# Patient Record
Sex: FEMALE | Race: WHITE | NOT HISPANIC OR LATINO | Employment: OTHER | URBAN - NONMETROPOLITAN AREA
[De-identification: names, ages, dates, MRNs, and addresses within clinical notes are randomized per-mention and may not be internally consistent; named-entity substitution may affect disease eponyms.]

---

## 2017-02-20 ENCOUNTER — OFFICE VISIT (OUTPATIENT)
Dept: NEUROSURGERY | Facility: CLINIC | Age: 72
End: 2017-02-20

## 2017-02-20 VITALS
SYSTOLIC BLOOD PRESSURE: 138 MMHG | WEIGHT: 135 LBS | BODY MASS INDEX: 23.05 KG/M2 | HEIGHT: 64 IN | DIASTOLIC BLOOD PRESSURE: 82 MMHG

## 2017-02-20 DIAGNOSIS — G56.03 CARPAL TUNNEL SYNDROME, BILATERAL: ICD-10-CM

## 2017-02-20 DIAGNOSIS — Z78.9 TOBACCO NON-USER: ICD-10-CM

## 2017-02-20 DIAGNOSIS — M51.36 DEGENERATION OF LUMBAR INTERVERTEBRAL DISC: Primary | ICD-10-CM

## 2017-02-20 DIAGNOSIS — IMO0001 NORMAL BODY MASS INDEX (BMI): ICD-10-CM

## 2017-02-20 PROCEDURE — 99212 OFFICE O/P EST SF 10 MIN: CPT | Performed by: NURSE PRACTITIONER

## 2017-02-20 RX ORDER — ESTRADIOL 0.5 MG/1
0.5 TABLET ORAL DAILY
COMMUNITY
End: 2018-09-07

## 2017-02-20 RX ORDER — CYCLOBENZAPRINE HCL 5 MG
5 TABLET ORAL AS NEEDED
COMMUNITY
End: 2017-02-20

## 2017-02-20 RX ORDER — CYCLOBENZAPRINE HCL 10 MG
10 TABLET ORAL 3 TIMES DAILY PRN
COMMUNITY
End: 2017-02-20 | Stop reason: SDUPTHER

## 2017-02-20 RX ORDER — LEVOTHYROXINE SODIUM 137 UG/1
125 TABLET ORAL DAILY
COMMUNITY
End: 2017-05-04 | Stop reason: SDUPTHER

## 2017-02-20 RX ORDER — CYCLOBENZAPRINE HCL 10 MG
10 TABLET ORAL 3 TIMES DAILY PRN
Qty: 90 TABLET | Refills: 3 | Status: SHIPPED | OUTPATIENT
Start: 2017-02-20 | End: 2017-03-22

## 2017-02-20 NOTE — PROGRESS NOTES
"Neurosurgery Follow Up Office Visit      Patient Name:  Mary Ann Jones  Age:  71 y.o.  YOB: 1945  MR#:  [id number]    Visit Vitals   • /82   • Ht 64\" (162.6 cm)   • Wt 135 lb (61.2 kg)   • BMI 23.17 kg/m2       Social History   Substance Use Topics   • Smoking status: Never Smoker   • Smokeless tobacco: Never Used   • Alcohol use Yes       Chief Complaint   Patient presents with   • Back Pain     Complains of increasing back pain x 2-3 months. No leg pain. Patient has not had physical therapy or pain management.    • Hand Pain     Follow up on carpal tunnel syndrome. She is still wearing wrist splints at bedtime and when napping.          History  Chief Complaint:  She returns for 6 month follow-up.  She is seen with more chronic issues of low back pain and also a newer diagnosis of bilateral carpal tunnel.  She has been managing pretty well.  She is very, very pleased that she is now established with Dr. Davis.  They have been working to decrease and actually discontinued the majority of her chronic medications.  He has wanted her to very much limit the use of meloxicam.  She is now using it when necessary.  Flexeril seems to still be the most helpful thing for her.  On rare occasion she will take half of a Norco.  Most of the time she relies on over-the-counter pain relievers.  She continues to get excellent relief with use of wrist splints.  As far as her low back she is also bothered by her hips but has had no radicular features in quite some time.  She has known abnormality with disc degeneration at L4 5, but she has had no finding of a true surgical lesion.  She hopes to delay any type of surgery as long as possible.      Physical Examination:  Upon exam, she looks really good.  She is very pleasant and cooperative.  She moves about fairly well, but is a bit slower and stiff appearing.  She has positive Tinel's bilaterally, more on the right.  Weakness of thumb abduction bilaterally.  " Thenar muscle is pretty good.  I do not see a great deal of atrophy.  Phalen's is unremarkable.  Straight leg raising is done well bilaterally.  Deep tendon reflexes are diminished at both patellar, more on the left.  No response at either ankle.  Generalized lower extremity weakness.      Medical Decision Making  Treatment Options:   In the office we have discussed her care at length.  She is given a refill of Flexeril, otherwise has plenty of medication.  We have gently talked about the idea of moving to APRN status with us.  She is a bit reluctant to do that.  I have explained I feel that Dr. Davis's office would be agreeable if we asked them to assume her meloxicam and Flexeril.  We will probably need to move toward that at some point.  We will have agreed on extending to a 1 year follow-up with our office.  She understands that she may call us at any time if she is having new or worsening problems with either her carpal tunnel or her back pain, and we will see her.  She seems comfortable and agreeable with this.      Assessment and Plan  Mary Ann was seen today for back pain and hand pain.    Diagnoses and all orders for this visit:    Degeneration of lumbar intervertebral disc  -     cyclobenzaprine (FLEXERIL) 10 MG tablet; Take 1 tablet by mouth 3 (Three) Times a Day As Needed for muscle spasms for up to 30 days.    Carpal tunnel syndrome, bilateral  -     cyclobenzaprine (FLEXERIL) 10 MG tablet; Take 1 tablet by mouth 3 (Three) Times a Day As Needed for muscle spasms for up to 30 days.    Normal body mass index (BMI)    Tobacco non-user        Return in about 1 year (around 2/20/2018).      LEYLA Kennedy

## 2017-02-23 ENCOUNTER — TELEPHONE (OUTPATIENT)
Dept: NEUROSURGERY | Facility: CLINIC | Age: 72
End: 2017-02-23

## 2017-02-23 DIAGNOSIS — I70.211 ATHEROSCLEROSIS OF NATIVE ARTERY OF RIGHT LOWER EXTREMITY WITH INTERMITTENT CLAUDICATION (HCC): Primary | ICD-10-CM

## 2017-02-23 NOTE — TELEPHONE ENCOUNTER
"Patient has called stating that she has had a \"quick, shocking sensation\" across her right ankle. Since this has happened, she now has numbness from her R knee down to her toes and also is experiencing pain in her R knee. She is requesting an ankle brace like she has for her wrist. What do you recommend?   "

## 2017-02-23 NOTE — TELEPHONE ENCOUNTER
I have returned the patient's phone call, she still complains of severe numbness/tingling in the right leg, only below the knee. Has pain in/around the right ankle and complains of a sore right knee. I have spoke with Rosana in office about this and she would like the patient to have a venous ultrasound to rule out blood clot. I have explained this to the patient and she is agreeable to scheduling. If test comes back normal, we should consider new MRI lumbar spine to rule out any changes within the spine. I will create the order and will forward to Kassidy. Patient wants to be 100% certain that her insurance will cover this test before performing. I have already informed her that if the test comes back positive for blood clot that she would have to see a specialist for that.

## 2017-02-23 NOTE — TELEPHONE ENCOUNTER
That would be ok.  Probably an air cast or ankle stirrup.  She could also try wrapping it with an ace bandage for support.  We could give her that if she wanted to come get one.  Probably noticing some difference from changing the way she takes her meds.  Might need to consider taking Mobic a little more frequently to see if she notices a difference. If she does, may need to discuss with Dr. Davis that she does not do as well without it.

## 2017-02-23 NOTE — TELEPHONE ENCOUNTER
Ms Jones, still waiting on phone call regarding her call yesterday. Very anxious.  Left ankle having a shocked feeling, like the carpal tunnel did in his wrist. Knee is numb down leg. Advised she probably needs to call her PCP, said they would just tell her that is out of their realm of expertise. ??   Would like a call back from you regarding this. She will be leaving at 1:30 p.m. Today.  Advised ya'll had been in clinic.

## 2017-03-10 ENCOUNTER — TELEPHONE (OUTPATIENT)
Dept: NEUROSURGERY | Facility: CLINIC | Age: 72
End: 2017-03-10

## 2017-03-10 DIAGNOSIS — M51.36 DEGENERATION OF LUMBAR INTERVERTEBRAL DISC: Primary | ICD-10-CM

## 2017-03-10 NOTE — TELEPHONE ENCOUNTER
Patient has called today stating that her back pain has progressively worsened. She complains that she is having a hard time walking and also bending. She questions if she can take 2 Flexeril at once. I inform her that she could try that, but I wouldn't recommend doing that a lot. I have also encouraged her if she is in that much pain to come to ED so that she can possibly receive an injection to help with her pain. She is scheduled for her ultrasound of legs on Monday but is now requesting to do the lumbar MRI soon as she can. She is continuing her Meloxicam daily and is also taking Flexeril 3 times daily. Would you like to order an MRI?

## 2017-03-13 ENCOUNTER — APPOINTMENT (OUTPATIENT)
Dept: ULTRASOUND IMAGING | Facility: HOSPITAL | Age: 72
End: 2017-03-13

## 2017-03-13 NOTE — TELEPHONE ENCOUNTER
I am fine with ordering an MRI lumbar without.  I have just not understood that she's wanted any more testing.  She may just have to consider taking a little bit of pain medicine.  I am ok with prescribing that, but didn't think she wanted that either.  If she continues having problems, she needs to see Dr. Davis.

## 2017-03-16 ENCOUNTER — TELEPHONE (OUTPATIENT)
Dept: OTOLARYNGOLOGY | Facility: CLINIC | Age: 72
End: 2017-03-16

## 2017-03-16 ENCOUNTER — TELEPHONE (OUTPATIENT)
Dept: NEUROSURGERY | Facility: CLINIC | Age: 72
End: 2017-03-16

## 2017-03-16 NOTE — TELEPHONE ENCOUNTER
Received in basked from Kecia Durand in regards to the MRI lumbar spine that our office had ordered. Apparently upon contacting the patient to schedule this test, she informed Kecia that her PCP told her this test wasn't going to help her pain and that this was something she was going to have to live with. I called Kecia to confirm this and to find out more information. She also didn't want to pay for the testing, which we had already obtained authorization for. This is the second test that our office has scheduled for this patient and that she has canceled.

## 2017-03-16 NOTE — TELEPHONE ENCOUNTER
Dr. Davis's office called needing a letter to explain why she is on a higher dose of Synthroid.  Dr. Davis is trying to regulate the medication and pt does not want anyone to regulate it other than Dr. East.  Dr. Davis will release her unless they get a reason why it is so high and that we do the regulating of the medication.

## 2017-05-04 ENCOUNTER — OFFICE VISIT (OUTPATIENT)
Dept: PODIATRY | Facility: CLINIC | Age: 72
End: 2017-05-04

## 2017-05-04 VITALS
HEIGHT: 64 IN | DIASTOLIC BLOOD PRESSURE: 62 MMHG | HEART RATE: 84 BPM | BODY MASS INDEX: 23.22 KG/M2 | SYSTOLIC BLOOD PRESSURE: 126 MMHG | WEIGHT: 136 LBS

## 2017-05-04 DIAGNOSIS — M79.671 FOOT PAIN, BILATERAL: Primary | ICD-10-CM

## 2017-05-04 DIAGNOSIS — M79.672 FOOT PAIN, BILATERAL: Primary | ICD-10-CM

## 2017-05-04 DIAGNOSIS — L60.2 HYPERTROPHY OF NAIL: ICD-10-CM

## 2017-05-04 DIAGNOSIS — E03.9 HYPOTHYROIDISM, UNSPECIFIED TYPE: Primary | ICD-10-CM

## 2017-05-04 DIAGNOSIS — L84 CALLUS OF FOOT: ICD-10-CM

## 2017-05-04 PROCEDURE — 99203 OFFICE O/P NEW LOW 30 MIN: CPT | Performed by: PODIATRIST

## 2017-05-04 PROCEDURE — 11056 PARNG/CUTG B9 HYPRKR LES 2-4: CPT | Performed by: PODIATRIST

## 2017-05-04 RX ORDER — LEVOTHYROXINE SODIUM 0.1 MG/1
100 TABLET ORAL DAILY
Qty: 30 TABLET | Refills: 3 | Status: SHIPPED | OUTPATIENT
Start: 2017-05-04 | End: 2017-08-07 | Stop reason: SDUPTHER

## 2017-05-04 RX ORDER — LEVOTHYROXINE SODIUM 125 MCG
TABLET ORAL
Qty: 30 TABLET | Refills: 0 | OUTPATIENT
Start: 2017-05-04

## 2017-05-05 ENCOUNTER — OFFICE VISIT (OUTPATIENT)
Dept: CARDIOLOGY | Facility: CLINIC | Age: 72
End: 2017-05-05

## 2017-05-05 VITALS
HEIGHT: 64 IN | HEART RATE: 97 BPM | SYSTOLIC BLOOD PRESSURE: 150 MMHG | BODY MASS INDEX: 22.88 KG/M2 | OXYGEN SATURATION: 99 % | DIASTOLIC BLOOD PRESSURE: 80 MMHG | WEIGHT: 134 LBS

## 2017-05-05 DIAGNOSIS — I47.1 PAROXYSMAL ATRIAL TACHYCARDIA (HCC): ICD-10-CM

## 2017-05-05 DIAGNOSIS — I10 ESSENTIAL HYPERTENSION: ICD-10-CM

## 2017-05-05 DIAGNOSIS — R07.89 OTHER CHEST PAIN: Primary | ICD-10-CM

## 2017-05-05 PROCEDURE — 93000 ELECTROCARDIOGRAM COMPLETE: CPT | Performed by: INTERNAL MEDICINE

## 2017-05-05 PROCEDURE — 99203 OFFICE O/P NEW LOW 30 MIN: CPT | Performed by: INTERNAL MEDICINE

## 2017-05-05 RX ORDER — ASPIRIN 325 MG
325 TABLET ORAL DAILY
COMMUNITY
End: 2017-08-07 | Stop reason: ALTCHOICE

## 2017-05-26 ENCOUNTER — OFFICE VISIT (OUTPATIENT)
Dept: OTOLARYNGOLOGY | Facility: CLINIC | Age: 72
End: 2017-05-26

## 2017-05-26 ENCOUNTER — PROCEDURE VISIT (OUTPATIENT)
Dept: OTOLARYNGOLOGY | Facility: CLINIC | Age: 72
End: 2017-05-26

## 2017-05-26 VITALS
DIASTOLIC BLOOD PRESSURE: 87 MMHG | TEMPERATURE: 97.3 F | HEIGHT: 64 IN | BODY MASS INDEX: 22.71 KG/M2 | SYSTOLIC BLOOD PRESSURE: 144 MMHG | HEART RATE: 85 BPM | WEIGHT: 133 LBS

## 2017-05-26 DIAGNOSIS — E03.9 HYPOTHYROIDISM, UNSPECIFIED TYPE: ICD-10-CM

## 2017-05-26 DIAGNOSIS — C73 PAPILLARY THYROID CARCINOMA (HCC): Primary | ICD-10-CM

## 2017-05-26 DIAGNOSIS — R26.89 IMBALANCE: Primary | ICD-10-CM

## 2017-05-26 DIAGNOSIS — R26.89 IMBALANCE: ICD-10-CM

## 2017-05-26 DIAGNOSIS — H90.5 SNHL (SENSORINEURAL HEARING LOSS): ICD-10-CM

## 2017-05-26 PROCEDURE — 99214 OFFICE O/P EST MOD 30 MIN: CPT | Performed by: NURSE PRACTITIONER

## 2017-05-26 RX ORDER — LEVOTHYROXINE SODIUM 0.12 MG/1
125 TABLET ORAL DAILY
COMMUNITY
Start: 2017-05-04 | End: 2017-12-06 | Stop reason: SDUPTHER

## 2017-06-02 ENCOUNTER — TELEPHONE (OUTPATIENT)
Dept: CARDIOLOGY | Facility: CLINIC | Age: 72
End: 2017-06-02

## 2017-06-02 DIAGNOSIS — R07.9 CHEST PAIN, UNSPECIFIED TYPE: Primary | ICD-10-CM

## 2017-06-02 NOTE — TELEPHONE ENCOUNTER
Yes, if her symptoms are worsening left go ahead and get a stress test.  I ordered an exercise stress echo.

## 2017-06-15 ENCOUNTER — HOSPITAL ENCOUNTER (OUTPATIENT)
Dept: CARDIOLOGY | Facility: HOSPITAL | Age: 72
Discharge: HOME | End: 2017-06-15
Attending: INTERNAL MEDICINE | Admitting: INTERNAL MEDICINE

## 2017-06-15 VITALS — HEART RATE: 81 BPM | DIASTOLIC BLOOD PRESSURE: 81 MMHG | SYSTOLIC BLOOD PRESSURE: 129 MMHG

## 2017-06-15 PROCEDURE — 93018 CV STRESS TEST I&R ONLY: CPT | Performed by: INTERNAL MEDICINE

## 2017-06-15 PROCEDURE — C8928 TTE W OR W/O FOL W/CON,STRES: HCPCS

## 2017-06-15 PROCEDURE — 25010000003 DOBUTAMINE PER 250 MG: Performed by: INTERNAL MEDICINE

## 2017-06-15 PROCEDURE — 93352 ADMIN ECG CONTRAST AGENT: CPT | Performed by: INTERNAL MEDICINE

## 2017-06-15 PROCEDURE — 93017 CV STRESS TEST TRACING ONLY: CPT

## 2017-06-15 PROCEDURE — 93350 STRESS TTE ONLY: CPT | Performed by: INTERNAL MEDICINE

## 2017-06-15 PROCEDURE — 25010000002 PERFLUTREN (DEFINITY) 8.476 MG IN SODIUM CHLORIDE 10 ML INJECTION: Performed by: INTERNAL MEDICINE

## 2017-06-15 RX ORDER — DOBUTAMINE HYDROCHLORIDE 100 MG/100ML
10-50 INJECTION INTRAVENOUS
Status: DISCONTINUED | OUTPATIENT
Start: 2017-06-15 | End: 2017-06-16 | Stop reason: HOSPADM

## 2017-06-15 RX ADMIN — SODIUM CHLORIDE 10 ML: 9 INJECTION INTRAMUSCULAR; INTRAVENOUS; SUBCUTANEOUS at 10:52

## 2017-06-15 RX ADMIN — ATROPINE SULFATE 0.3 MG: 0.1 INJECTION PARENTERAL at 10:59

## 2017-06-15 RX ADMIN — Medication 10 MCG/KG/MIN: at 10:52

## 2017-06-16 LAB
BH CV STRESS BP STAGE 1: NORMAL
BH CV STRESS BP STAGE 2: NORMAL
BH CV STRESS BP STAGE 3: NORMAL
BH CV STRESS DOSE DOBUTAMINE STAGE 1: 10
BH CV STRESS DOSE DOBUTAMINE STAGE 2: 20
BH CV STRESS DOSE DOBUTAMINE STAGE 3: 30
BH CV STRESS DURATION MIN STAGE 1: 3
BH CV STRESS DURATION MIN STAGE 2: 3
BH CV STRESS DURATION MIN STAGE 3: 3
BH CV STRESS DURATION SEC STAGE 1: 0
BH CV STRESS DURATION SEC STAGE 2: 0
BH CV STRESS DURATION SEC STAGE 3: 32
BH CV STRESS HR STAGE 1: 81
BH CV STRESS HR STAGE 2: 99
BH CV STRESS HR STAGE 3: 136
BH CV STRESS PROTOCOL 1: NORMAL
BH CV STRESS RECOVERY BP: NORMAL MMHG
BH CV STRESS RECOVERY HR: 98 BPM
BH CV STRESS STAGE 1: 1
BH CV STRESS STAGE 2: 2
BH CV STRESS STAGE 3: 3
MAXIMAL PREDICTED HEART RATE: 148 BPM
PERCENT MAX PREDICTED HR: 91.89 %
STRESS BASELINE BP: NORMAL MMHG
STRESS BASELINE HR: 81 BPM
STRESS PERCENT HR: 108 %
STRESS POST EXERCISE DUR MIN: 9 MIN
STRESS POST EXERCISE DUR SEC: 32 SEC
STRESS POST PEAK BP: NORMAL MMHG
STRESS POST PEAK HR: 136 BPM
STRESS TARGET HR: 126 BPM

## 2017-06-18 ENCOUNTER — APPOINTMENT (OUTPATIENT)
Dept: GENERAL RADIOLOGY | Facility: HOSPITAL | Age: 72
End: 2017-06-18

## 2017-06-18 ENCOUNTER — HOSPITAL ENCOUNTER (EMERGENCY)
Facility: HOSPITAL | Age: 72
Discharge: HOME | End: 2017-06-18
Admitting: EMERGENCY MEDICINE

## 2017-06-18 VITALS
SYSTOLIC BLOOD PRESSURE: 138 MMHG | HEART RATE: 80 BPM | RESPIRATION RATE: 14 BRPM | HEIGHT: 64 IN | BODY MASS INDEX: 21.68 KG/M2 | TEMPERATURE: 97.7 F | WEIGHT: 127 LBS | OXYGEN SATURATION: 96 % | DIASTOLIC BLOOD PRESSURE: 77 MMHG

## 2017-06-18 DIAGNOSIS — M54.5 LOW BACK PAIN, UNSPECIFIED BACK PAIN LATERALITY, UNSPECIFIED CHRONICITY, WITH SCIATICA PRESENCE UNSPECIFIED: Primary | ICD-10-CM

## 2017-06-18 PROCEDURE — 99284 EMERGENCY DEPT VISIT MOD MDM: CPT

## 2017-06-18 PROCEDURE — 25010000002 MORPHINE SULFATE (PF) 2 MG/ML SOLUTION: Performed by: NURSE PRACTITIONER

## 2017-06-18 PROCEDURE — 96372 THER/PROPH/DIAG INJ SC/IM: CPT

## 2017-06-18 PROCEDURE — 72110 X-RAY EXAM L-2 SPINE 4/>VWS: CPT

## 2017-06-18 PROCEDURE — 73502 X-RAY EXAM HIP UNI 2-3 VIEWS: CPT

## 2017-06-18 RX ORDER — MORPHINE SULFATE 2 MG/ML
2 INJECTION, SOLUTION INTRAMUSCULAR; INTRAVENOUS ONCE
Status: COMPLETED | OUTPATIENT
Start: 2017-06-18 | End: 2017-06-18

## 2017-06-18 RX ORDER — MORPHINE SULFATE 2 MG/ML
2 INJECTION, SOLUTION INTRAMUSCULAR; INTRAVENOUS ONCE
Status: DISCONTINUED | OUTPATIENT
Start: 2017-06-18 | End: 2017-06-18 | Stop reason: HOSPADM

## 2017-06-18 RX ORDER — CYCLOBENZAPRINE HCL 10 MG
10 TABLET ORAL 3 TIMES DAILY PRN
Qty: 9 TABLET | Refills: 0 | Status: SHIPPED | OUTPATIENT
Start: 2017-06-18 | End: 2017-06-21

## 2017-06-18 RX ORDER — METHYLPREDNISOLONE 4 MG/1
TABLET ORAL
Qty: 21 TABLET | Refills: 0 | Status: SHIPPED | OUTPATIENT
Start: 2017-06-18 | End: 2017-08-07

## 2017-06-18 RX ORDER — HYDROCODONE BITARTRATE AND ACETAMINOPHEN 5; 325 MG/1; MG/1
1 TABLET ORAL EVERY 6 HOURS PRN
Qty: 12 TABLET | Refills: 0 | Status: SHIPPED | OUTPATIENT
Start: 2017-06-18 | End: 2017-06-21

## 2017-06-18 RX ORDER — ONDANSETRON 4 MG/1
4 TABLET, ORALLY DISINTEGRATING ORAL ONCE
Status: COMPLETED | OUTPATIENT
Start: 2017-06-18 | End: 2017-06-18

## 2017-06-18 RX ADMIN — ONDANSETRON 4 MG: 4 TABLET, ORALLY DISINTEGRATING ORAL at 11:05

## 2017-06-18 RX ADMIN — MORPHINE SULFATE 2 MG: 2 INJECTION, SOLUTION INTRAMUSCULAR; INTRAVENOUS at 11:05

## 2017-06-18 NOTE — ED PROVIDER NOTES
Subjective   HPI Comments: Patient is a 71-year-old  female who presents ER today with complaint of left lower back and hip pain.  Patient reports that approximately one week ago she missed a step and almost fell.  States she did cough catch herself and did not land on the ground.  Patient states that she then began to have some left lower back pain.  Patient reports that several days ago on Wednesday of this past week that she fell and missed a step.  Since she landed on her outstretched hands.  Patient reports that since that time she has had left lower back pain and left hip pain.  Patient has a history of chronic low back pain and has followed up with Dr. blanco in the past for this.  She reports that she had a MRI approximately one month ago however she is unsure of the results.  Patient denies any loss of bowel or bladder control, she denies any saddle anesthesias.  Patient reports that she did take a Lortab this morning at home for her pain however has not helped.  Patient is able to ambulate with even slow steady gait.  She presents ER today for further evaluation.    Patient is a 71 y.o. female presenting with back pain.   History provided by:  Patient   used: No    Back Pain   Location:  Lumbar spine  Quality:  Stabbing and stiffness  Stiffness is present:  All day  Radiates to:  L posterior upper leg  Pain severity:  Moderate  Pain is:  Same all the time  Onset quality:  Sudden  Duration:  1 week  Timing:  Constant  Progression:  Worsening  Chronicity:  New  Context: falling and recent injury    Context: not emotional stress, not jumping from heights, not lifting heavy objects, not MCA, not MVA, not occupational injury, not pedestrian accident, not physical stress, not recent illness and not twisting    Relieved by:  Nothing  Worsened by:  Nothing  Ineffective treatments:  None tried  Associated symptoms: leg pain    Associated symptoms: no abdominal pain, no abdominal  swelling, no bladder incontinence, no bowel incontinence, no chest pain, no dysuria, no fever, no headaches, no numbness, no paresthesias, no pelvic pain, no perianal numbness, no tingling, no weakness and no weight loss    Risk factors: no hx of cancer, no hx of osteoporosis, no lack of exercise, no menopause, not obese, not pregnant, no recent surgery, no steroid use and no vascular disease        Review of Systems   Constitutional: Negative for fever and weight loss.   Cardiovascular: Negative for chest pain.   Gastrointestinal: Negative for abdominal pain and bowel incontinence.   Genitourinary: Negative for bladder incontinence, dysuria and pelvic pain.   Musculoskeletal: Positive for back pain.   Neurological: Negative for tingling, weakness, numbness, headaches and paresthesias.   All other systems reviewed and are negative.      Past Medical History:   Diagnosis Date   • Allergic rhinitis    • Arthritis    • Chronic back pain    • Depression    • GERD (gastroesophageal reflux disease)    • Hypothyroidism    • Melanoma    • Thyroid cancer        No Known Allergies    Past Surgical History:   Procedure Laterality Date   • APPENDECTOMY     • BREAST RECONSTRUCTION     • BREAST SURGERY      Ede mastectomy   • CHOLECYSTECTOMY     • COLONOSCOPY     • ESOPHAGEAL DILATATION     • HYSTERECTOMY     • SKIN CANCER EXCISION     • TOTAL THYROIDECTOMY         Family History   Problem Relation Age of Onset   • Cancer Mother      breast   • Alzheimer's disease Father        Social History     Social History   • Marital status:      Spouse name: N/A   • Number of children: N/A   • Years of education: N/A     Social History Main Topics   • Smoking status: Former Smoker     Years: 30.00     Types: Cigarettes   • Smokeless tobacco: Never Used   • Alcohol use Yes      Comment: socially   • Drug use: No   • Sexual activity: Defer     Other Topics Concern   • None     Social History Narrative           Objective   Physical  Exam   Constitutional: She is oriented to person, place, and time. She appears well-developed and well-nourished.   HENT:   Head: Normocephalic and atraumatic.   Eyes: Conjunctivae are normal. Pupils are equal, round, and reactive to light.   Neck: Normal range of motion. Neck supple.   Cardiovascular: Normal rate, regular rhythm and normal heart sounds.    Pulmonary/Chest: Effort normal and breath sounds normal.   Abdominal: Soft. Bowel sounds are normal.   Musculoskeletal: Normal range of motion.        Arms:  Neurological: She is alert and oriented to person, place, and time.   Skin: Skin is warm and dry.   Psychiatric: She has a normal mood and affect.   Nursing note and vitals reviewed.      Procedures         ED Course  ED Course   Comment By Time   Patient x-rays reviewed.  There are no acute findings noted.  At this time patient will be discharged home in stable condition.  Asked patient please follow-up with her orthopedic physician Dr. blanco tomorrow. Kassidy Mirandaon, APRN 06/18 1209   I will give the patient short course of Norco.  Also give her some muscle relaxers as well as a Medrol Dosepak to use for her discomfort.  I asked her to please follow-up to primary care provider tomorrow.  Avoid painful motions.  At this time she will be discharged home in stable condition she is asked return to the ER if any new or worsening symptoms. Kassidy CHRISTIANSEN Karson, APRN 06/18 1211   [facility] report # [id number] reviewed; no suspicious findings noted Kassidy Mirandaon, APRN 06/18 1211      XR Hip With or Without Pelvis 2 - 3 View Left   Preliminary Result   Impression:   1. No acute osseous pathology.   2. Mild to moderate degenerative changes at the hip joints bilaterally   and mild degenerative change of the sacroiliac joints bilaterally.   3. Transitional anatomy with an enlarged left transverse process at L5   with pseudoarticulation.   This report was finalized on  by Dr. Betsy Marina MD.      XR Spine Lumbar  4+ View   Preliminary Result   Impression:   1. No acute osseous pathology.   2. S-shaped curvature of the lumbar spine as described above, chronic   findings, similar.   3. Multilevel degenerative changes.       This report was finalized on  by Dr. Betsy Marina MD.        Labs Reviewed - No data to display              MDM  Number of Diagnoses or Management Options  Low back pain, unspecified back pain laterality, unspecified chronicity, with sciatica presence unspecified: new and requires workup     Amount and/or Complexity of Data Reviewed  Tests in the radiology section of CPT®: ordered and reviewed    Patient Progress  Patient progress: stable      Final diagnoses:   Low back pain, unspecified back pain laterality, unspecified chronicity, with sciatica presence unspecified            Kassidy Ty, APRN  06/18/17 1225

## 2017-08-07 ENCOUNTER — OFFICE VISIT (OUTPATIENT)
Dept: CARDIOLOGY | Facility: CLINIC | Age: 72
End: 2017-08-07

## 2017-08-07 VITALS
HEIGHT: 64 IN | SYSTOLIC BLOOD PRESSURE: 128 MMHG | BODY MASS INDEX: 23.22 KG/M2 | HEART RATE: 88 BPM | DIASTOLIC BLOOD PRESSURE: 82 MMHG | OXYGEN SATURATION: 99 % | WEIGHT: 136 LBS

## 2017-08-07 DIAGNOSIS — R07.89 OTHER CHEST PAIN: Primary | ICD-10-CM

## 2017-08-07 DIAGNOSIS — I47.1 PAROXYSMAL ATRIAL TACHYCARDIA (HCC): ICD-10-CM

## 2017-08-07 DIAGNOSIS — I10 ESSENTIAL HYPERTENSION: ICD-10-CM

## 2017-08-07 PROCEDURE — 99213 OFFICE O/P EST LOW 20 MIN: CPT | Performed by: INTERNAL MEDICINE

## 2017-08-07 NOTE — PROGRESS NOTES
Reason for Visit: cardiovascular follow up.    HPI:  aMry Ann Jones is a 71 y.o. female is here today for follow-up.  She was last seen in May for chest pain and abnormal Holter monitor.  The Holter monitor demonstrated a run of nonsustained atrial tachycardia and rare ventricular beats.  She has not had any palpitations since her last visit.  She has had no significant chest pain.  She thinks the problem may have been GI related.    Previous Cardiac Testing and Procedures:  - Holter monitor (07/18/2016) rare atrial ectopic beats with one 18 beat run of nonsustained atrial tachycardia, rare ventricular ectopic beats, no symptoms  - Dobutamine stress echo (06/16/2017) low risk for ischemia    Patient Active Problem List   Diagnosis   • Degeneration of lumbar intervertebral disc   • Normal body mass index (BMI)   • Tobacco non-user   • Carpal tunnel syndrome, bilateral       Social History   Substance Use Topics   • Smoking status: Former Smoker     Years: 30.00     Types: Cigarettes   • Smokeless tobacco: Never Used   • Alcohol use Yes      Comment: socially       Family History   Problem Relation Age of Onset   • Cancer Mother      breast   • Alzheimer's disease Father        The following portions of the patient's history were reviewed and updated as appropriate: allergies, current medications, past family history, past medical history, past social history, past surgical history and problem list.      Current Outpatient Prescriptions:   •  cyclobenzaprine (FLEXERIL) 10 MG tablet, Take 10 mg by mouth 3 (Three) Times a Day As Needed for Muscle Spasms., Disp: , Rfl:   •  estradiol (ESTRACE) 0.5 MG tablet, Take 0.5 mg by mouth Daily., Disp: , Rfl:   •  glucosamine sulfate 500 MG capsule capsule, Take 1,000 mg by mouth Daily., Disp: , Rfl:   •  levothyroxine (SYNTHROID, LEVOTHROID) 125 MCG tablet, Take 125 mcg by mouth Daily., Disp: , Rfl:   •  diazePAM (VALIUM) 5 MG tablet, Take 5 mg by mouth Daily As Needed for  "Anxiety., Disp: , Rfl:     Review of Systems   Constitution: Negative for chills and fever.   Cardiovascular: Negative for chest pain and paroxysmal nocturnal dyspnea.   Respiratory: Negative for cough and shortness of breath.    Skin: Negative for rash.   Gastrointestinal: Negative for abdominal pain and heartburn.   Neurological: Negative for dizziness and numbness.       Objective   /82 (BP Location: Left arm, Patient Position: Sitting, Cuff Size: Adult)  Pulse 88  Ht 64\" (162.6 cm)  Wt 136 lb (61.7 kg)  SpO2 99%  BMI 23.34 kg/m2  Physical Exam   Constitutional: She is oriented to person, place, and time. She appears well-developed and well-nourished.   HENT:   Head: Normocephalic and atraumatic.   Cardiovascular: Normal rate, regular rhythm and normal heart sounds.    No murmur heard.  Pulmonary/Chest: Effort normal and breath sounds normal.   Musculoskeletal: She exhibits no edema.   Neurological: She is alert and oriented to person, place, and time.   Skin: Skin is warm and dry.   Psychiatric: She has a normal mood and affect.     Procedures      ICD-10-CM ICD-9-CM   1. Other chest pain R07.89 786.59   2. Paroxysmal atrial tachycardia I47.1 427.0   3. Essential hypertension I10 401.9         Assessment/Plan:  1. Chest pain: Symptoms are atypical and consistent with noncardiac etiology.  Dobutamine stress echo in June was negative for ischemia.  Offered reassurance.     2.  Paroxysmal atrial tachycardia: 18 beat run of nonsustained atrial tachycardia noted on Holter monitor from 8/2016.  Appears to be an incidental finding.  Denies any palpitations.     3.  Essential hypertension: Blood pressure is within normal limits today.  Previous elevations may have been secondary to anxiety.  "

## 2017-09-27 ENCOUNTER — LAB (OUTPATIENT)
Dept: LAB | Facility: HOSPITAL | Age: 72
End: 2017-09-27

## 2017-09-27 DIAGNOSIS — E03.9 HYPOTHYROIDISM, UNSPECIFIED TYPE: ICD-10-CM

## 2017-09-27 DIAGNOSIS — C73 PAPILLARY THYROID CARCINOMA (HCC): ICD-10-CM

## 2017-09-27 PROCEDURE — 84432 ASSAY OF THYROGLOBULIN: CPT | Performed by: NURSE PRACTITIONER

## 2017-09-27 PROCEDURE — 36415 COLL VENOUS BLD VENIPUNCTURE: CPT

## 2017-10-02 LAB — THYROGLOBULIN (TG-RIA): <2 NG/ML

## 2017-11-24 DIAGNOSIS — G56.03 CARPAL TUNNEL SYNDROME, BILATERAL: ICD-10-CM

## 2017-11-24 DIAGNOSIS — M51.36 DEGENERATION OF LUMBAR INTERVERTEBRAL DISC: ICD-10-CM

## 2017-11-27 RX ORDER — CYCLOBENZAPRINE HCL 10 MG
TABLET ORAL
Qty: 90 TABLET | Refills: 3 | OUTPATIENT
Start: 2017-11-27

## 2017-12-06 ENCOUNTER — OFFICE VISIT (OUTPATIENT)
Dept: OTOLARYNGOLOGY | Facility: CLINIC | Age: 72
End: 2017-12-06

## 2017-12-06 VITALS
WEIGHT: 137.2 LBS | SYSTOLIC BLOOD PRESSURE: 132 MMHG | BODY MASS INDEX: 23.42 KG/M2 | TEMPERATURE: 97.6 F | HEIGHT: 64 IN | DIASTOLIC BLOOD PRESSURE: 82 MMHG

## 2017-12-06 DIAGNOSIS — C73 PAPILLARY THYROID CARCINOMA (HCC): Primary | ICD-10-CM

## 2017-12-06 DIAGNOSIS — E03.9 HYPOTHYROIDISM, UNSPECIFIED TYPE: ICD-10-CM

## 2017-12-06 PROCEDURE — 99213 OFFICE O/P EST LOW 20 MIN: CPT | Performed by: OTOLARYNGOLOGY

## 2017-12-06 RX ORDER — HYDROCHLOROTHIAZIDE 12.5 MG/1
CAPSULE, GELATIN COATED ORAL
COMMUNITY
Start: 2017-10-05 | End: 2018-08-21 | Stop reason: SDUPTHER

## 2017-12-06 RX ORDER — NAPROXEN SODIUM 220 MG
220 TABLET ORAL 2 TIMES DAILY PRN
COMMUNITY
End: 2019-11-07

## 2017-12-06 RX ORDER — LEVOTHYROXINE SODIUM 0.12 MG/1
125 TABLET ORAL DAILY
Qty: 90 TABLET | Refills: 3 | Status: SHIPPED | OUTPATIENT
Start: 2017-12-06

## 2017-12-06 RX ORDER — MELOXICAM 15 MG/1
15 TABLET ORAL
COMMUNITY
End: 2018-07-17

## 2017-12-06 NOTE — PROGRESS NOTES
Patient Intake Note    Review of Systems  Review of Systems   Constitutional: Negative for chills, fatigue and fever.   HENT:        See HPI   Respiratory: Negative for cough, choking, shortness of breath and wheezing.    Cardiovascular: Negative.    Gastrointestinal: Negative for constipation, diarrhea, nausea and vomiting.   Allergic/Immunologic: Negative for environmental allergies and food allergies.   Neurological: Positive for light-headedness (off balance at times). Negative for dizziness and headaches.   Hematological: Does not bruise/bleed easily.   Psychiatric/Behavioral: Positive for sleep disturbance (insomnia).         Rachell Alegria  12/6/2017  9:05 AM

## 2017-12-06 NOTE — PROGRESS NOTES
Patient Care Team:  Brendan Tuttle MD as PCP - General (Emergency Medicine)  Jake Davsi MD as PCP - Claims Attributed  Danny East MD as Consulting Physician (Otolaryngology)  Jake Davis MD as Referring Physician (Internal Medicine)  Dillan Cole MD as Cardiologist (Cardiology)    Chief complaint : Follow up thyroid problems    Subjective     Mary Ann Jones is a 72 y.o. female for follow up. 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 Thyroglobulin on 9/17/17 was < 2.0    Review of Systems  Reviewed as per patient intake note.    History  Past Medical History:   Diagnosis Date   • Allergic rhinitis    • Arthritis    • Chronic back pain    • Depression    • GERD (gastroesophageal reflux disease)    • Hypothyroidism    • Insomnia    • Melanoma    • Thyroid cancer      Past Surgical History:   Procedure Laterality Date   • APPENDECTOMY     • BREAST RECONSTRUCTION     • BREAST SURGERY      Ede mastectomy   • CHOLECYSTECTOMY     • COLONOSCOPY     • ESOPHAGEAL DILATATION     • HYSTERECTOMY     • SKIN CANCER EXCISION     • TOTAL THYROIDECTOMY       Family History   Problem Relation Age of Onset   • Cancer Mother      breast   • Alzheimer's disease Father      Social History   Substance Use Topics   • Smoking status: Former Smoker     Years: 30.00     Types: Cigarettes   • Smokeless tobacco: Never Used   • Alcohol use Yes      Comment: socially       Current Outpatient Prescriptions:   •  cyclobenzaprine (FLEXERIL) 10 MG tablet, Take 10 mg by mouth 3 (Three) Times a Day As Needed for Muscle Spasms., Disp: , Rfl:   •  diazePAM (VALIUM) 5 MG tablet, Take 5 mg by mouth Daily As Needed for Anxiety., Disp: , Rfl:   •  estradiol (ESTRACE) 0.5 MG tablet, Take 0.5 mg by mouth Daily., Disp: , Rfl:   •  glucosamine sulfate 500 MG capsule capsule, Take 1,000 mg by mouth Daily., Disp: , Rfl:   •  hydrochlorothiazide (MICROZIDE)  12.5 MG capsule, , Disp: , Rfl:   •  levothyroxine (SYNTHROID, LEVOTHROID) 125 MCG tablet, Take 1 tablet by mouth Daily., Disp: 90 tablet, Rfl: 3  •  meloxicam (MOBIC) 15 MG tablet, Take 15 mg by mouth., Disp: , Rfl:   •  naproxen sodium (ALEVE) 220 MG tablet, Take 220 mg by mouth., Disp: , Rfl:   Allergies:  Review of patient's allergies indicates no known allergies.    Objective     Vital Signs  Temp:  [97.6 °F (36.4 °C)] 97.6 °F (36.4 °C)  BP: (132)/(82) 132/82    Physical Exam:  CONSTITUTIONAL: well nourished, well-developed, alert, oriented, in no acute distress   COMMUNICATION AND VOICE: able to communicate normally, normal voice quality  HEAD: normocephalic, no lesions, atraumatic, no tenderness, no masses   FACE: appearance normal, no lesions, no tenderness, no deformities, facial motion symmetric  EYES: ocular motility normal, eyelids normal, orbits normal, no proptosis, conjunctiva normal , pupils equal, round   EARS:  Hearing: hearing to conversational voice intact bilaterally   External Ears: normal bilaterally, no lesions  NOSE:  External Nose: external nasal structure normal, no tenderness on palpation, no nasal discharge, no lesions, no evidence of trauma, nostrils patent   ORAL:  Lips: upper and lower lips without lesion   NECK:  Inspection and Palpation: neck appearance normal, no masses or tenderness  THYROID: non-tender, no mass, well healed incision present with no palpable thyroid tissue  CHEST/RESPIRATORY: normal respiratory effort   CARDIOVASCULAR: no cyanosis or edema   NEUROLOGICAL/PSYCHIATRIC: oriented to time, place and person, mood normal, affect appropriate, CN II-XII intact grossly    RESULTS REVIEW:    Lab Results   Component Value Date    TGRIA <2.0 09/27/2017          Assessment   1. Papillary thyroid carcinoma    2. Hypothyroidism, unspecified type        Plan   She is chemically cured by her undetectable thyroglobulin levels.  She can follow up with her primary care physician.  I  would recommend that she stays on her 125 µg both to suppress her TSH levels but also due to her preference at that dose.  I have written her prescription for a 3 month re-refill and after that she can get her prescriptions from her primary care physician.    ----INSTRUCTIONS----  Call for sooner evaluation if increasing size of the thyroid or nodules, increasing dysphagia, lymphadenopathy, neck tightness or other thyroid problems.     Return if symptoms worsen or fail to improve.      Danny East MD  12/06/17  10:08 AM

## 2017-12-09 DIAGNOSIS — M51.36 DEGENERATION OF LUMBAR INTERVERTEBRAL DISC: ICD-10-CM

## 2017-12-09 DIAGNOSIS — G56.03 CARPAL TUNNEL SYNDROME, BILATERAL: ICD-10-CM

## 2017-12-11 RX ORDER — CYCLOBENZAPRINE HCL 10 MG
TABLET ORAL
Qty: 90 TABLET | Refills: 3 | OUTPATIENT
Start: 2017-12-11

## 2018-01-18 ENCOUNTER — HOSPITAL ENCOUNTER (OUTPATIENT)
Dept: ULTRASOUND IMAGING | Facility: HOSPITAL | Age: 73
Discharge: HOME | End: 2018-01-18
Attending: EMERGENCY MEDICINE | Admitting: EMERGENCY MEDICINE

## 2018-01-18 ENCOUNTER — HOSPITAL ENCOUNTER (OUTPATIENT)
Dept: GENERAL RADIOLOGY | Facility: HOSPITAL | Age: 73
Discharge: HOME | End: 2018-01-18
Attending: EMERGENCY MEDICINE | Admitting: EMERGENCY MEDICINE

## 2018-01-18 ENCOUNTER — TRANSCRIBE ORDERS (OUTPATIENT)
Dept: ADMINISTRATIVE | Facility: HOSPITAL | Age: 73
End: 2018-01-18

## 2018-01-18 ENCOUNTER — TRANSCRIBE ORDERS (OUTPATIENT)
Dept: GENERAL RADIOLOGY | Facility: HOSPITAL | Age: 73
End: 2018-01-18

## 2018-01-18 DIAGNOSIS — S60.011A CONTUSION OF RIGHT THUMB WITHOUT DAMAGE TO NAIL, INITIAL ENCOUNTER: Primary | ICD-10-CM

## 2018-01-18 DIAGNOSIS — S60.011A CONTUSION OF RIGHT THUMB WITHOUT DAMAGE TO NAIL, INITIAL ENCOUNTER: ICD-10-CM

## 2018-01-18 DIAGNOSIS — M79.89 ARM SWELLING: ICD-10-CM

## 2018-01-18 PROCEDURE — 93931 UPPER EXTREMITY STUDY: CPT

## 2018-01-18 PROCEDURE — 73130 X-RAY EXAM OF HAND: CPT

## 2018-01-18 PROCEDURE — 93931 UPPER EXTREMITY STUDY: CPT | Performed by: SURGERY

## 2018-07-17 PROCEDURE — 87086 URINE CULTURE/COLONY COUNT: CPT | Performed by: FAMILY MEDICINE

## 2018-07-27 ENCOUNTER — OFFICE VISIT (OUTPATIENT)
Dept: UROLOGY | Facility: CLINIC | Age: 73
End: 2018-07-27

## 2018-07-27 VITALS — TEMPERATURE: 97.6 F | WEIGHT: 130 LBS | BODY MASS INDEX: 20.89 KG/M2 | HEIGHT: 66 IN

## 2018-07-27 DIAGNOSIS — R33.9 INCOMPLETE EMPTYING OF BLADDER: Primary | ICD-10-CM

## 2018-07-27 DIAGNOSIS — N81.10 VAGINAL PROLAPSE: ICD-10-CM

## 2018-07-27 LAB
BILIRUB BLD-MCNC: NEGATIVE MG/DL
CLARITY, POC: CLEAR
COLOR UR: YELLOW
GLUCOSE UR STRIP-MCNC: NEGATIVE MG/DL
KETONES UR QL: NEGATIVE
LEUKOCYTE EST, POC: NEGATIVE
NITRITE UR-MCNC: NEGATIVE MG/ML
PH UR: 5.5 [PH] (ref 5–8)
PROT UR STRIP-MCNC: NEGATIVE MG/DL
RBC # UR STRIP: ABNORMAL /UL
SP GR UR: 1 (ref 1–1.03)
UROBILINOGEN UR QL: NORMAL

## 2018-07-27 PROCEDURE — 51798 US URINE CAPACITY MEASURE: CPT | Performed by: UROLOGY

## 2018-07-27 PROCEDURE — 87086 URINE CULTURE/COLONY COUNT: CPT | Performed by: UROLOGY

## 2018-07-27 PROCEDURE — 99204 OFFICE O/P NEW MOD 45 MIN: CPT | Performed by: UROLOGY

## 2018-07-27 PROCEDURE — 81001 URINALYSIS AUTO W/SCOPE: CPT | Performed by: UROLOGY

## 2018-07-27 NOTE — PROGRESS NOTES
Ms. Jones is 72 y.o. female    Chief Complaint   Patient presents with   • incomplete bladder emptying       History of Present Illness  Patient is here for follow-up from an urgent care visit for incomplete bladder emptying.  She was diagnosed at that time as having a urinary tract infection.  She states that for several months, she has had issues with inability to completely empty her bladder and the need to push to get her urine output.  It is associated with cystocele and rectocele.  Nothing seems to make this better.  It is been constant and mildly progressive    The following portions of the patient's history were reviewed and updated as appropriate: allergies, current medications, past family history, past medical history, past social history, past surgical history and problem list.    Review of Systems   Constitutional: Negative for appetite change, chills, fatigue, fever and unexpected weight change.   HENT: Negative for congestion, dental problem, ear pain, hearing loss, nosebleeds, sinus pressure and trouble swallowing.    Eyes: Negative for pain, discharge, redness and itching.   Respiratory: Negative for apnea, cough, choking and shortness of breath.    Cardiovascular: Negative for chest pain and palpitations.   Gastrointestinal: Negative for abdominal distention, abdominal pain, blood in stool, constipation, diarrhea, nausea and vomiting.   Endocrine: Negative for cold intolerance and heat intolerance.   Genitourinary: Positive for flank pain, frequency and urgency. Negative for decreased urine volume, difficulty urinating, dyspareunia, dysuria, enuresis, genital sores, hematuria, menstrual problem, pelvic pain, vaginal bleeding, vaginal discharge and vaginal pain.   Musculoskeletal: Negative for arthralgias, back pain and gait problem.   Skin: Negative for pallor, rash and wound.   Allergic/Immunologic: Negative for immunocompromised state.   Neurological: Negative for dizziness, tremors,  "seizures, weakness, numbness and headaches.   Hematological: Negative for adenopathy. Does not bruise/bleed easily.   Psychiatric/Behavioral: Negative for agitation, behavioral problems, hallucinations, self-injury and suicidal ideas.         Current Outpatient Prescriptions:   •  cyclobenzaprine (FLEXERIL) 10 MG tablet, Take 10 mg by mouth 3 (Three) Times a Day As Needed for Muscle Spasms., Disp: , Rfl:   •  diazePAM (VALIUM) 5 MG tablet, Take 5 mg by mouth Daily As Needed for Anxiety., Disp: , Rfl:   •  estradiol (ESTRACE) 0.5 MG tablet, Take 0.5 mg by mouth Daily., Disp: , Rfl:   •  hydrochlorothiazide (MICROZIDE) 12.5 MG capsule, , Disp: , Rfl:   •  levothyroxine (SYNTHROID, LEVOTHROID) 125 MCG tablet, Take 1 tablet by mouth Daily., Disp: 90 tablet, Rfl: 3  •  naproxen sodium (ALEVE) 220 MG tablet, Take 220 mg by mouth., Disp: , Rfl:     Past Medical History:   Diagnosis Date   • Allergic rhinitis    • Arthritis    • Chronic back pain    • Depression    • GERD (gastroesophageal reflux disease)    • Hypothyroidism    • Insomnia    • Melanoma (CMS/HCC)    • Thyroid cancer (CMS/HCC)        Past Surgical History:   Procedure Laterality Date   • APPENDECTOMY     • BREAST RECONSTRUCTION     • BREAST SURGERY      Ede mastectomy   • CHOLECYSTECTOMY     • COLONOSCOPY     • ESOPHAGEAL DILATATION     • HYSTERECTOMY     • SKIN CANCER EXCISION     • TOTAL THYROIDECTOMY         Social History     Social History   • Marital status:      Social History Main Topics   • Smoking status: Former Smoker     Years: 30.00     Types: Cigarettes   • Smokeless tobacco: Never Used   • Alcohol use Yes      Comment: socially   • Drug use: No   • Sexual activity: Defer     Other Topics Concern   • Not on file       Family History   Problem Relation Age of Onset   • Cancer Mother         breast   • Alzheimer's disease Father        Objective    Temp 97.6 °F (36.4 °C)   Ht 167.6 cm (66\")   Wt 59 kg (130 lb)   BMI 20.98 kg/m² "     Physical Exam  Constitutional: Well nourished, Well developed; No apparent distress  Psychiatric: Appropriate affect; Alert and oriented  Eyes: Unremarkable  Musculoskeletal: Normal gait and station  GI: Abdomen is soft, non-tender  Respiratory: No distress; Unlabored movement; No accessory musculature needed with symmetric movements  Skin: No pallor or diaphoresis  : Labia normal; Urethral meatus normal position, not stenotic; significant cystocele and rectocele.    Admission on 07/17/2018, Discharged on 07/17/2018   Component Date Value Ref Range Status   • Color 07/17/2018 Yellow  Yellow, Straw, Dark Yellow, Margaux Final   • Clarity, UA 07/17/2018 Clear  Clear Final   • Glucose, UA 07/17/2018 Negative  Negative, 1000 mg/dL (3+) mg/dL Final   • Bilirubin 07/17/2018 Negative  Negative Final   • Ketones, UA 07/17/2018 Negative  Negative Final   • Specific Gravity  07/17/2018 1.005  1.005 - 1.030 Final   • Blood, UA 07/17/2018 Negative  Negative Final   • pH, Urine 07/17/2018 7.0  5.0 - 8.0 Final   • Protein, POC 07/17/2018 Negative  Negative mg/dL Final   • Urobilinogen, UA 07/17/2018 Normal  Normal Final   • Nitrite, UA 07/17/2018 Negative  Negative Final   • Leukocytes 07/17/2018 Negative  Negative Final   • Urine Culture 07/17/2018 No growth at 2 days   Final       Results for orders placed or performed in visit on 07/27/18   POC Urinalysis Dipstick, Multipro   Result Value Ref Range    Color Yellow Yellow, Straw, Dark Yellow, Margaux    Clarity, UA Clear Clear    Glucose, UA Negative Negative, 1000 mg/dL (3+) mg/dL    Bilirubin Negative Negative    Ketones, UA Negative Negative    Specific Gravity  1.005 1.005 - 1.030    Blood, UA Trace (A) Negative    pH, Urine 5.5 5.0 - 8.0    Protein, POC Negative Negative mg/dL    Urobilinogen, UA Normal Normal    Nitrite, UA Negative Negative    Leukocytes Negative Negative     Estimation of residual urine via abdominal ultrasound  Residual Urine: 90 ml  Indication:  difficulty urinating  Position: Supine  Examination: Incremental scanning of the suprapubic area using 3 MHz transducer using copious amounts of acoustic gel.   Findings: An anechoic area was demonstrated which represented the bladder, with measurement of residual urine as noted. I inspected this myself. In that the residual urine was stable or insignificant, no treatment will be necessary at this time.       Assessment and Plan    Mary Ann was seen today for incomplete bladder emptying.    Diagnoses and all orders for this visit:    Incomplete emptying of bladder  -     Cystoscopy  -     POC Urinalysis Dipstick, Multipro    Vaginal prolapse  -     Ambulatory Referral to Gynecology  -     POC Urinalysis Dipstick, Multipro    I reviewed her records from urgent care.  She was diagnosed with times having acute urinary infection.  Her urine culture at that time was negative.  I do not think that time she had an infection.  Her residual today was approximately 90 mL so she is emptying, but somewhat incompletely.  She has a significant rectocele and cystocele, and I do believe that the cystocele is contributing to her inability to fully empty her bladder.  I have made a referral to gynecology for this.  I will send a urine culture today    She has a remote history of having urethral dilations done in [place].  I would like to perform cystoscopy to rule out urethral stricture as well as any other bladder lesions.  She understands risk and benefits as outlined below.    Cystoscopy as the definitive lower urinary tract study is discussed . The risks of pain and discomfort, infection, and urethral stricture are discussed with the patient including the technique used in the office setting.  All patient questions were answered.

## 2018-07-29 LAB — BACTERIA SPEC AEROBE CULT: NORMAL

## 2018-07-31 ENCOUNTER — PROCEDURE VISIT (OUTPATIENT)
Dept: UROLOGY | Facility: CLINIC | Age: 73
End: 2018-07-31

## 2018-07-31 DIAGNOSIS — R33.9 INCOMPLETE EMPTYING OF BLADDER: Primary | ICD-10-CM

## 2018-07-31 PROCEDURE — 52000 CYSTOURETHROSCOPY: CPT | Performed by: UROLOGY

## 2018-07-31 NOTE — PROGRESS NOTES
Pre- operative diagnosis:  Incomplete bladder emptying    Post operative diagnosis:  Cystocele    Procedure:  The patient was prepped and draped in a normal sterile fashion.  The urethra was anesthetized with 2% lidocaine jelly.  A rigid cystoscope was introduced per urethra.  The urethra is normal in appearance without obstruction or mass.  The bladder is without evidence of mucosal lesion.   There is no abnormality of the urothelium.  There is minimal trabeculation of the detrusor muscle.  The ureteral orifices are relatively normal in position and they efflux clear urine.  She does have a moderate cystocele on exam    Patient tolerated the procedure well    Complications: none    Blood loss: minimal    Follow up:    No abnormalities seen within the bladder or urethra.  She did have a history of urethral dilations for an unknown reason.  There are no urethral strictures.  Again, she does have a significant cystocele at think addressing this first prior to any other intervention is best.  She is scheduled to see the gynecologist this week.  I did discuss with her that after addressing the cystocele, if her symptoms do not improve I would be happy to see her back.  She will need a urodynamic study to interpret any bladder dysfunction prior to moving forward.

## 2018-08-02 ENCOUNTER — TELEPHONE (OUTPATIENT)
Dept: UROLOGY | Facility: CLINIC | Age: 73
End: 2018-08-02

## 2018-08-02 NOTE — TELEPHONE ENCOUNTER
Nela states she went to gynecologist per Dr Rai's instructions and that she was told they could not find anything wrong. She wishes to proceed with what Dr Rai wants to do. His note mentions UDS. Should that be scheduled? Also patient wants to know if she needs referral to gastro based on Dr Rai's findings?

## 2018-08-03 NOTE — TELEPHONE ENCOUNTER
Called patient to let her know I spoke with Dr. Rai and he strongly recommended that the patient get a 2nd opinion. Dr. Rai recommended patient to see one of the OB/GYN at [facility] [place] and he would put in the referral. I am awaiting call back to see if patient would be okay with this.

## 2018-08-06 NOTE — TELEPHONE ENCOUNTER
I spoke with patient, I let her know that Dr. Rai recommended patient go see Dr. Horn's for a 2nd opinion. I have put in the referral. Patient confirmed understanding.

## 2018-08-21 ENCOUNTER — OFFICE VISIT (OUTPATIENT)
Dept: OBSTETRICS AND GYNECOLOGY | Facility: CLINIC | Age: 73
End: 2018-08-21

## 2018-08-21 ENCOUNTER — TELEPHONE (OUTPATIENT)
Dept: OBSTETRICS AND GYNECOLOGY | Facility: CLINIC | Age: 73
End: 2018-08-21

## 2018-08-21 VITALS
WEIGHT: 138 LBS | SYSTOLIC BLOOD PRESSURE: 118 MMHG | HEIGHT: 64 IN | DIASTOLIC BLOOD PRESSURE: 66 MMHG | BODY MASS INDEX: 23.56 KG/M2

## 2018-08-21 DIAGNOSIS — Z46.89 FITTING AND ADJUSTMENT OF PESSARY: Primary | ICD-10-CM

## 2018-08-21 DIAGNOSIS — N81.6 RECTOCELE: ICD-10-CM

## 2018-08-21 DIAGNOSIS — N81.11 CYSTOCELE, MIDLINE: ICD-10-CM

## 2018-08-21 PROCEDURE — A4562 PESSARY, NON RUBBER,ANY TYPE: HCPCS | Performed by: NURSE PRACTITIONER

## 2018-08-21 PROCEDURE — 99213 OFFICE O/P EST LOW 20 MIN: CPT | Performed by: NURSE PRACTITIONER

## 2018-08-21 PROCEDURE — 57160 INSERT PESSARY/OTHER DEVICE: CPT | Performed by: NURSE PRACTITIONER

## 2018-08-21 RX ORDER — HYDROCHLOROTHIAZIDE 12.5 MG/1
12.5 TABLET ORAL DAILY
COMMUNITY
Start: 2018-08-16

## 2018-08-21 RX ORDER — ESTRADIOL 2 MG/1
2 TABLET ORAL DAILY
COMMUNITY
Start: 2018-07-05

## 2018-08-21 NOTE — PROGRESS NOTES
Mary Ann Jones is a 72 y.o.      Chief Complaint   Patient presents with   • Bladder Prolapse     Pt is here on referral from Dr Rai for a cystocele            HPI -Mary Ann is in, she recently saw Dr. Jose Angel Rai for who sent her here for evaluation of a cystocele.  He thought the cystocele was a contributing factor to the feeling of not completely emptying her bladder .  He did a cystoscope and it was normal. She is up to void 4-5 times per night, she does not feel like she empties her bladder completely at times.  She has mild LISETH and mild urge incontinence.  She does have constipation and she is aware of a fullness in the vaginal area when constipated.  She is sexually active.      The following portions of the patient's history were reviewed and updated as appropriate:vital signs, allergies, current medications, past family history, past medical history, past social history, past surgical history and problem list.      Current Outpatient Prescriptions:   •  cyclobenzaprine (FLEXERIL) 10 MG tablet, Take 10 mg by mouth 3 (Three) Times a Day As Needed for Muscle Spasms., Disp: , Rfl:   •  diazePAM (VALIUM) 5 MG tablet, Take 5 mg by mouth Daily As Needed for Anxiety., Disp: , Rfl:   •  estradiol (ESTRACE) 2 MG tablet, , Disp: , Rfl:   •  hydrochlorothiazide (HYDRODIURIL) 12.5 MG tablet, , Disp: , Rfl:   •  levothyroxine (SYNTHROID, LEVOTHROID) 125 MCG tablet, Take 1 tablet by mouth Daily., Disp: 90 tablet, Rfl: 3  •  naproxen sodium (ALEVE) 220 MG tablet, Take 220 mg by mouth., Disp: , Rfl:   •  estradiol (ESTRACE) 0.5 MG tablet, Take 0.5 mg by mouth Daily., Disp: , Rfl:     Review of Systems   Constitutional: Negative for activity change, appetite change and chills.   HENT: Negative for congestion, dental problem, drooling and ear discharge.    Gastrointestinal: Positive for constipation. Negative for abdominal distention, abdominal pain and GERD.   Endocrine: Negative for breast discharge.  "  Genitourinary: Positive for frequency, urgency and vaginal discharge. Negative for dyspareunia.   Musculoskeletal: Positive for arthralgias and back pain.   Neurological: Negative for dizziness and confusion.   Psychiatric/Behavioral: Negative for agitation and behavioral problems.     Breast ROS: negative    Objective      /66   Ht 162.6 cm (64\")   Wt 62.6 kg (138 lb)   BMI 23.69 kg/m²       Physical Exam   Constitutional: She is oriented to person, place, and time. She appears well-developed and well-nourished.   HENT:   Head: Normocephalic and atraumatic.   Eyes: Right eye exhibits no discharge. Left eye exhibits no discharge.   Pulmonary/Chest: Effort normal. No respiratory distress.   Abdominal: Soft. She exhibits no distension. There is no tenderness.   Genitourinary: There is no rash on the right labia. There is no tenderness or lesion on the left labia. Right adnexum displays no mass, no tenderness and no fullness. Left adnexum displays no mass, no tenderness and no fullness. No erythema, tenderness or bleeding in the vagina. No foreign body in the vagina. No signs of injury around the vagina. No vaginal discharge found. There is a cystocele and rectocele present in the vagina.  Musculoskeletal: She exhibits no edema or deformity.   Neurological: She is alert and oriented to person, place, and time.   Skin: Skin is dry.   Psychiatric: She has a normal mood and affect.   Nursing note and vitals reviewed.      Mary Ann was seen today for bladder prolapse.    Diagnoses and all orders for this visit:    Fitting and adjustment of pessary   Ring with floor    Inserted and patient ambulates and empties bladder, she states it is not uncomfortable      Cystocele, midline  Grade 1    Rectocele, grade 2-3    Urodynamics and RTO with Dr. Pearson.          Denisha James, APRN  8/21/2018           "

## 2018-08-21 NOTE — TELEPHONE ENCOUNTER
Pt said when she got home her pessary fell out while trying to have a BM. I spoke with Jill and pt said she has urodynamics testing this Thursday and then she has an appt with Dr Pearson in 2 weeks. Jill said to leave out since she was able to get in with Dr Pearson

## 2018-09-05 ENCOUNTER — OFFICE VISIT (OUTPATIENT)
Dept: OBSTETRICS AND GYNECOLOGY | Facility: CLINIC | Age: 73
End: 2018-09-05

## 2018-09-05 VITALS
DIASTOLIC BLOOD PRESSURE: 82 MMHG | WEIGHT: 136 LBS | SYSTOLIC BLOOD PRESSURE: 160 MMHG | BODY MASS INDEX: 23.22 KG/M2 | HEIGHT: 64 IN

## 2018-09-05 DIAGNOSIS — N81.6 RECTOCELE: ICD-10-CM

## 2018-09-05 DIAGNOSIS — N39.3 SUI (STRESS URINARY INCONTINENCE, FEMALE): ICD-10-CM

## 2018-09-05 DIAGNOSIS — N81.11 CYSTOCELE, MIDLINE: ICD-10-CM

## 2018-09-05 DIAGNOSIS — Z01.818 PRE-OP EXAMINATION: Primary | ICD-10-CM

## 2018-09-05 PROCEDURE — 99214 OFFICE O/P EST MOD 30 MIN: CPT | Performed by: OBSTETRICS & GYNECOLOGY

## 2018-09-05 RX ORDER — PHENAZOPYRIDINE HYDROCHLORIDE 100 MG/1
200 TABLET, FILM COATED ORAL ONCE
Status: CANCELLED | OUTPATIENT
Start: 2018-09-05 | End: 2018-09-05

## 2018-09-05 NOTE — H&P
Subjective     Chief Complaint   Patient presents with   • Results     pt here today for urodynamics results. pt has tried using a pessary and says that it fell out the first time she urinated. pt does have trouble emptying her bladder. pt also says she has trouble with passing stool. pt voices no other concerns.        Mary Ann Jones is a 72 y.o. year old who presents to be seen for pelvic prolapse.      The patient is s/p hysterectomy.  She reports positive vaginal pressure, urinary incontinence, sensation of incomplete bladder emptying and stool trapping, but denies pain with intercourse.  The patient feels like the problem began several years ago.  She is currently sexually active, and is interested in being sexually active in the future.  Mary Ann Jones has not had surgery for this problem in the past.    Past Medical History:   Diagnosis Date   • Allergic rhinitis    • Arthritis    • Chronic back pain    • Depression    • GERD (gastroesophageal reflux disease)    • Hypothyroidism    • Insomnia    • Melanoma (CMS/HCC)    • Thyroid cancer (CMS/HCC)        Current Outpatient Prescriptions:   •  cyclobenzaprine (FLEXERIL) 10 MG tablet, Take 10 mg by mouth 3 (Three) Times a Day As Needed for Muscle Spasms., Disp: , Rfl:   •  diazePAM (VALIUM) 5 MG tablet, Take 5 mg by mouth Daily As Needed for Anxiety., Disp: , Rfl:   •  estradiol (ESTRACE) 0.5 MG tablet, Take 0.5 mg by mouth Daily., Disp: , Rfl:   •  estradiol (ESTRACE) 2 MG tablet, , Disp: , Rfl:   •  hydrochlorothiazide (HYDRODIURIL) 12.5 MG tablet, , Disp: , Rfl:   •  levothyroxine (SYNTHROID, LEVOTHROID) 125 MCG tablet, Take 1 tablet by mouth Daily., Disp: 90 tablet, Rfl: 3  •  naproxen sodium (ALEVE) 220 MG tablet, Take 220 mg by mouth., Disp: , Rfl:   Family History   Problem Relation Age of Onset   • Cancer Mother         breast   • Alzheimer's disease Father      Social History     Social History   • Marital status:      Social History Main  "Topics   • Smoking status: Former Smoker     Years: 30.00     Types: Cigarettes   • Smokeless tobacco: Never Used   • Alcohol use Yes      Comment: socially   • Drug use: No   • Sexual activity: Defer     Other Topics Concern   • Not on file     No Known Allergies    Family History   Problem Relation Age of Onset   • Cancer Mother         breast   • Alzheimer's disease Father      Review of Systems   Constitutional: Negative for activity change and unexpected weight change.   Respiratory: Negative for shortness of breath.    Cardiovascular: Negative for chest pain.   Gastrointestinal: Negative for abdominal pain.   Genitourinary: Positive for difficulty urinating, enuresis and vaginal pain (pressure). Negative for dyspareunia, dysuria, pelvic pain, vaginal bleeding and vaginal discharge.   Musculoskeletal: Positive for arthralgias and back pain.           Objective   /82   Ht 162.6 cm (64\")   Wt 61.7 kg (136 lb)   BMI 23.34 kg/m²      Physical Exam   Constitutional: She is oriented to person, place, and time. She appears well-developed and well-nourished. No distress.   HENT:   Head: Normocephalic and atraumatic.   Eyes: EOM are normal.   Neck: Normal range of motion.   Pulmonary/Chest: Effort normal.   Abdominal: Soft. She exhibits no distension.   Genitourinary:   Genitourinary Comments: Normal external .  Good support of cuff.  Grade II cystocele, grade III rectocele.  Mucosa unremarkable.   Musculoskeletal: Normal range of motion.   Neurological: She is alert and oriented to person, place, and time.   Skin: Skin is warm and dry.   Psychiatric: She has a normal mood and affect. Her behavior is normal. Judgment normal.   Nursing note and vitals reviewed.      Imaging   No data reviewed       Assessment & Plan    Mary Ann was seen today for results.    Diagnoses and all orders for this visit:    Pre-op examination  -     Case Request; Standing  -     CBC and Differential; Future  -     ECG 12 Lead; " Future  -     ceFAZolin (ANCEF) 2 g in sodium chloride 0.9 % 100 mL IVPB; Infuse 2 g into a venous catheter Every 8 (Eight) Hours.  -     phenazopyridine (PYRIDIUM) tablet 200 mg; Take 2 tablets by mouth 1 (One) Time.  -     Case Request    Cystocele, midline: Anterior repair and/or Posterior repair were discussed with the patient as one options for management of her condition.  The patient was advised that expectant management or the use of a pessary were more conservative options and that surgery for prolapse is only necessary if the patient is having bladder or bowel dysfunction.  The patient feels that surgery is appropriate for her and wishes to proceed.  The surgical procedure, including risks and benefits, was described.  Post-op restrictions were reviewed.  All questions were answered to her satisfaction.      Rectocele    LISETH (stress urinary incontinence, female): Treatment options for the patient's stress urinary incontinence were reviewed to include PT for pelvic strengthening and a mid-urethral sling.  The patient was advised that the success rate of a mid-urethral sling is approximately 85%; in addition, she was also informed that studies have indicated PT to be equally successful if patients are compliant.  The risks of surgery were described to include the short term need for a camp catheter due to local swelling, the possible need to self-cath for three months if the sling is tensioned too tightly, and the possibility of surgical site infection.  Questions were answered, and the patient voiced understanding.  Will be scheduled with AR/PA    Other orders  -     Outpatient In A Bed; Standing  -     Follow Anesthesia Guidelines / Standing Orders; Future  -     Follow Anesthesia Guidelines / Standing Orders; Standing  -     Place sequential compression device; Standing  -     Type & Screen; Standing  -     Obtain informed consent; Standing          Margaux Pearson MD  9/5/2018  10:22 AM

## 2018-09-05 NOTE — PROGRESS NOTES
Subjective     Chief Complaint   Patient presents with   • Results     pt here today for urodynamics results. pt has tried using a pessary and says that it fell out the first time she urinated. pt does have trouble emptying her bladder. pt also says she has trouble with passing stool. pt voices no other concerns.        Mary Ann Jones is a 72 y.o. year old who presents to be seen for pelvic prolapse.      The patient is s/p hysterectomy.  She reports positive vaginal pressure, urinary incontinence, sensation of incomplete bladder emptying and stool trapping, but denies pain with intercourse.  The patient feels like the problem began several years ago.  She is currently sexually active, and is interested in being sexually active in the future.  Mary Ann Jones has not had surgery for this problem in the past.    Past Medical History:   Diagnosis Date   • Allergic rhinitis    • Arthritis    • Chronic back pain    • Depression    • GERD (gastroesophageal reflux disease)    • Hypothyroidism    • Insomnia    • Melanoma (CMS/HCC)    • Thyroid cancer (CMS/HCC)        Current Outpatient Prescriptions:   •  cyclobenzaprine (FLEXERIL) 10 MG tablet, Take 10 mg by mouth 3 (Three) Times a Day As Needed for Muscle Spasms., Disp: , Rfl:   •  diazePAM (VALIUM) 5 MG tablet, Take 5 mg by mouth Daily As Needed for Anxiety., Disp: , Rfl:   •  estradiol (ESTRACE) 0.5 MG tablet, Take 0.5 mg by mouth Daily., Disp: , Rfl:   •  estradiol (ESTRACE) 2 MG tablet, , Disp: , Rfl:   •  hydrochlorothiazide (HYDRODIURIL) 12.5 MG tablet, , Disp: , Rfl:   •  levothyroxine (SYNTHROID, LEVOTHROID) 125 MCG tablet, Take 1 tablet by mouth Daily., Disp: 90 tablet, Rfl: 3  •  naproxen sodium (ALEVE) 220 MG tablet, Take 220 mg by mouth., Disp: , Rfl:   Family History   Problem Relation Age of Onset   • Cancer Mother         breast   • Alzheimer's disease Father      Social History     Social History   • Marital status:      Social History Main  "Topics   • Smoking status: Former Smoker     Years: 30.00     Types: Cigarettes   • Smokeless tobacco: Never Used   • Alcohol use Yes      Comment: socially   • Drug use: No   • Sexual activity: Defer     Other Topics Concern   • Not on file     No Known Allergies    Family History   Problem Relation Age of Onset   • Cancer Mother         breast   • Alzheimer's disease Father      Review of Systems   Constitutional: Negative for activity change and unexpected weight change.   Respiratory: Negative for shortness of breath.    Cardiovascular: Negative for chest pain.   Gastrointestinal: Negative for abdominal pain.   Genitourinary: Positive for difficulty urinating, enuresis and vaginal pain (pressure). Negative for dyspareunia, dysuria, pelvic pain, vaginal bleeding and vaginal discharge.   Musculoskeletal: Positive for arthralgias and back pain.           Objective   /82   Ht 162.6 cm (64\")   Wt 61.7 kg (136 lb)   BMI 23.34 kg/m²     Physical Exam   Constitutional: She is oriented to person, place, and time. She appears well-developed and well-nourished. No distress.   HENT:   Head: Normocephalic and atraumatic.   Eyes: EOM are normal.   Neck: Normal range of motion.   Pulmonary/Chest: Effort normal.   Abdominal: Soft. She exhibits no distension.   Genitourinary:   Genitourinary Comments: Normal external .  Good support of cuff.  Grade II cystocele, grade III rectocele.  Mucosa unremarkable.   Musculoskeletal: Normal range of motion.   Neurological: She is alert and oriented to person, place, and time.   Skin: Skin is warm and dry.   Psychiatric: She has a normal mood and affect. Her behavior is normal. Judgment normal.   Nursing note and vitals reviewed.      Imaging   No data reviewed       Assessment & Plan    Mary Ann was seen today for results.    Diagnoses and all orders for this visit:    Pre-op examination  -     Case Request; Standing  -     CBC and Differential; Future  -     ECG 12 Lead; " Future  -     ceFAZolin (ANCEF) 2 g in sodium chloride 0.9 % 100 mL IVPB; Infuse 2 g into a venous catheter Every 8 (Eight) Hours.  -     phenazopyridine (PYRIDIUM) tablet 200 mg; Take 2 tablets by mouth 1 (One) Time.  -     Case Request    Cystocele, midline: Anterior repair and/or Posterior repair were discussed with the patient as one options for management of her condition.  The patient was advised that expectant management or the use of a pessary were more conservative options and that surgery for prolapse is only necessary if the patient is having bladder or bowel dysfunction.  The patient feels that surgery is appropriate for her and wishes to proceed.  The surgical procedure, including risks and benefits, was described.  Post-op restrictions were reviewed.  All questions were answered to her satisfaction.      Rectocele    LISETH (stress urinary incontinence, female): Treatment options for the patient's stress urinary incontinence were reviewed to include PT for pelvic strengthening and a mid-urethral sling.  The patient was advised that the success rate of a mid-urethral sling is approximately 85%; in addition, she was also informed that studies have indicated PT to be equally successful if patients are compliant.  The risks of surgery were described to include the short term need for a camp catheter due to local swelling, the possible need to self-cath for three months if the sling is tensioned too tightly, and the possibility of surgical site infection.  Questions were answered, and the patient voiced understanding.  Will be scheduled with AR/ME    Other orders  -     Outpatient In A Bed; Standing  -     Follow Anesthesia Guidelines / Standing Orders; Future  -     Follow Anesthesia Guidelines / Standing Orders; Standing  -     Place sequential compression device; Standing  -     Type & Screen; Standing  -     Obtain informed consent; Standing          Margaux Pearson MD  9/5/2018  10:22 AM

## 2018-09-07 ENCOUNTER — APPOINTMENT (OUTPATIENT)
Dept: PREADMISSION TESTING | Facility: HOSPITAL | Age: 73
End: 2018-09-07

## 2018-09-07 VITALS
DIASTOLIC BLOOD PRESSURE: 65 MMHG | SYSTOLIC BLOOD PRESSURE: 134 MMHG | OXYGEN SATURATION: 95 % | HEART RATE: 79 BPM | RESPIRATION RATE: 16 BRPM | HEIGHT: 65 IN | BODY MASS INDEX: 22.77 KG/M2 | WEIGHT: 136.69 LBS

## 2018-09-07 DIAGNOSIS — Z01.818 PRE-OP EXAMINATION: ICD-10-CM

## 2018-09-07 LAB
ANION GAP SERPL CALCULATED.3IONS-SCNC: 8 MMOL/L (ref 4–13)
BASOPHILS # BLD AUTO: 0.02 10*3/MM3 (ref 0–0.2)
BASOPHILS NFR BLD AUTO: 0.4 % (ref 0–2)
BUN BLD-MCNC: 14 MG/DL (ref 5–21)
BUN/CREAT SERPL: 20.9 (ref 7–25)
CALCIUM SPEC-SCNC: 9 MG/DL (ref 8.4–10.4)
CHLORIDE SERPL-SCNC: 104 MMOL/L (ref 98–110)
CO2 SERPL-SCNC: 30 MMOL/L (ref 24–31)
CREAT BLD-MCNC: 0.67 MG/DL (ref 0.5–1.4)
DEPRECATED RDW RBC AUTO: 40.4 FL (ref 40–54)
EOSINOPHIL # BLD AUTO: 0.07 10*3/MM3 (ref 0–0.7)
EOSINOPHIL NFR BLD AUTO: 1.2 % (ref 0–4)
ERYTHROCYTE [DISTWIDTH] IN BLOOD BY AUTOMATED COUNT: 12 % (ref 12–15)
GFR SERPL CREATININE-BSD FRML MDRD: 86 ML/MIN/1.73
GLUCOSE BLD-MCNC: 98 MG/DL (ref 70–100)
HCT VFR BLD AUTO: 39.6 % (ref 37–47)
HGB BLD-MCNC: 13.4 G/DL (ref 12–16)
IMM GRANULOCYTES # BLD: 0.01 10*3/MM3 (ref 0–0.03)
IMM GRANULOCYTES NFR BLD: 0.2 % (ref 0–5)
LYMPHOCYTES # BLD AUTO: 1.53 10*3/MM3 (ref 0.72–4.86)
LYMPHOCYTES NFR BLD AUTO: 27.1 % (ref 15–45)
MCH RBC QN AUTO: 31.2 PG (ref 28–32)
MCHC RBC AUTO-ENTMCNC: 33.8 G/DL (ref 33–36)
MCV RBC AUTO: 92.1 FL (ref 82–98)
MONOCYTES # BLD AUTO: 0.43 10*3/MM3 (ref 0.19–1.3)
MONOCYTES NFR BLD AUTO: 7.6 % (ref 4–12)
NEUTROPHILS # BLD AUTO: 3.58 10*3/MM3 (ref 1.87–8.4)
NEUTROPHILS NFR BLD AUTO: 63.5 % (ref 39–78)
NRBC BLD MANUAL-RTO: 0 /100 WBC (ref 0–0)
PLATELET # BLD AUTO: 247 10*3/MM3 (ref 130–400)
PMV BLD AUTO: 8.5 FL (ref 6–12)
POTASSIUM BLD-SCNC: 4.3 MMOL/L (ref 3.5–5.3)
RBC # BLD AUTO: 4.3 10*6/MM3 (ref 4.2–5.4)
SODIUM BLD-SCNC: 142 MMOL/L (ref 135–145)
WBC NRBC COR # BLD: 5.64 10*3/MM3 (ref 4.8–10.8)

## 2018-09-07 PROCEDURE — 93005 ELECTROCARDIOGRAM TRACING: CPT

## 2018-09-07 PROCEDURE — 80048 BASIC METABOLIC PNL TOTAL CA: CPT | Performed by: OBSTETRICS & GYNECOLOGY

## 2018-09-07 PROCEDURE — 36415 COLL VENOUS BLD VENIPUNCTURE: CPT

## 2018-09-07 PROCEDURE — 93010 ELECTROCARDIOGRAM REPORT: CPT | Performed by: INTERNAL MEDICINE

## 2018-09-07 PROCEDURE — 85025 COMPLETE CBC W/AUTO DIFF WBC: CPT | Performed by: OBSTETRICS & GYNECOLOGY

## 2018-09-07 NOTE — DISCHARGE INSTRUCTIONS
DAY OF SURGERY INSTRUCTIONS        YOUR SURGEON: ***TONG MCFARLANE   PROCEDURE: ***ANTERIOR AND POSTERIOR VAGINAL REPAIR WITH MID URETHRAL SLING WITH CYSTOSCOPY     DATE OF SURGERY: ***SEPTEMBER 17, 2018     ARRIVAL TIME: AS DIRECTED BY OFFICE    DAY OF SURGERY TAKE ONLY THESE MEDICATIONS UNLESS OTHERWISE INSTRUCTED BY YOUR PHYSICIAN: ***NONE          MANAGING PAIN AFTER SURGERY    We know you are probably wondering what your pain will be like after surgery.  Following surgery it is unrealistic to expect you will not have pain.   Pain is how our bodies let us know that something is wrong or cautions us to be careful.  That said, our goal is to make your pain tolerable.    Methods we may use to treat your pain include (oral or IV medications, PCAs, epidurals, nerve blocks, etc.)   While some procedures require IV pain medications for a short time after surgery, transitioning to pain medications by mouth allows for better management of pain.   Your nurse will encourage you to take oral pain medications whenever possible.  IV medications work almost immediately, but only last a short while.  Taking medications by mouth allows for a more constant level of medication in your blood stream for a longer period of time.      Once your pain is out of control it is harder to get back under control.  It is important you are aware when your next dose of pain medication is due.  If you are admitted, your nurse may write the time of your next dose on the white board in your room to help you remember.      We are interested in your pain and encourage you to inform us about aggravating factors during your visit.   Many times a simple repositioning every few hours can make a big difference.    If your physician says it is okay, do not let your pain prevent you from getting out of bed. Be sure to call your nurse for assistance prior to getting up so you do not fall.      Before surgery, please decide your tolerable pain goal.  These  faces help describe the pain ratings we use on a 0-10 scale.   Be prepared to tell us your goal and whether or not you take pain or anxiety medications at home.            BEFORE YOU COME TO THE HOSPITAL  (Pre-op instructions)  • Do not eat, drink, smoke or chew gum after midnight the night before surgery.  This also includes no mints.  • Morning of surgery take only the medicines you have been instructed with a sip of water unless otherwise instructed  by your physician.  • Do not shave, wear makeup or dark nail polish.  • Remove all jewelry including rings.  • Leave anything you consider valuable at home.  • Leave your suitcase in the car until after your surgery.  • Bring the following with you if applicable:  o Picture ID and insurance, Medicare or Medicaid cards  o Co-pay/deductible required by insurance (cash, check, credit card)  o Copy of advance directive, living will or power-of- documents if not brought to PAT  o CPAP or BIPAP mask and tubing  o Relaxation aids (MP3 player, book, magazine)  • On the day of surgery check in at registration located at the main entrance of the hospital.       Outpatient Surgery Guidelines, Adult  Outpatient procedures are those for which the person having the procedure is allowed to go home the same day as the procedure. Various procedures are done on an outpatient basis. You should follow some general guidelines if you will be having an outpatient procedure.  LET YOUR HEALTH CARE PROVIDER KNOW ABOUT:  · Any allergies you have.  · All medicines you are taking, including vitamins, herbs, eye drops, creams, and over-the-counter medicines.  · Previous problems you or members of your family have had with the use of anesthetics.  · Any blood disorders you have.  · Previous surgeries you have had.  · Medical conditions you have.  RISKS AND COMPLICATIONS  Your health care provider will discuss possible risks and complications with you before surgery. Common risks and  complications include:    · Problems due to the use of anesthetics.  · Blood loss and replacement (does not apply to minor surgical procedures).  · Temporary increase in pain due to surgery.  · Uncorrected pain or problems that the surgery was meant to correct.  · Infection.  · New damage.  BEFORE THE PROCEDURE  · Ask your health care provider about changing or stopping your regular medicines. You may need to stop taking certain medicines in the days or weeks before the procedure.  · Stop smoking at least 2 weeks before surgery. This lowers your risk for complications during and after surgery. Ask your health care provider for help with this if needed.  · Eat your usual meals and a light supper the day before surgery. Continue fluid intake. Do not drink alcohol.  · Do not eat or drink after midnight the night before your surgery.   · Arrange for someone to take you home and to stay with you for 24 hours after the procedure. Medicine given for your procedure may affect your ability to drive or to care for yourself.  · Call your health care provider's office if you develop an illness or problem that may prevent you from safely having your procedure.  AFTER THE PROCEDURE  After surgery, you will be taken to a recovery area, where your progress will be monitored. If there are no complications, you will be allowed to go home when you are awake, stable, and taking fluids well. You may have numbness around the surgical site. Healing will take some time. You will have tenderness at the surgical site and may have some swelling and bruising. You may also have some nausea.  HOME CARE INSTRUCTIONS  · Do not drive for 24 hours, or as directed by your health care provider. Do not drive while taking prescription pain medicines.  · Do not drink alcohol for 24 hours.  · Do not make important decisions or sign legal documents for 24 hours.  · You may resume a normal diet and activities as directed.  · Do not lift anything heavier  than 10 pounds (4.5 kg) or play contact sports until your health care provider says it is okay.  · Change your bandages (dressings) as directed.  · Only take over-the-counter or prescription medicines as directed by your health care provider.  · Follow up with your health care provider as directed.  SEEK MEDICAL CARE IF:  · You have increased bleeding (more than a small spot) from the surgical site.  · You have redness, swelling, or increasing pain in the wound.  · You see pus coming from the wound.  · You have a fever.  · You notice a bad smell coming from the wound or dressing.  · You feel lightheaded or faint.  · You develop a rash.  · You have trouble breathing.  · You develop allergies.  MAKE SURE YOU:  · Understand these instructions.  · Will watch your condition.  · Will get help right away if you are not doing well or get worse.     This information is not intended to replace advice given to you by your health care provider. Make sure you discuss any questions you have with your health care provider.     Document Released: 09/12/2002 Document Revised: 05/03/2016 Document Reviewed: 05/22/2014  [employer] Interactive Patient Education ©2016 [employer]       Fall Prevention in Hospitals, Adult  As a hospital patient, your condition and the treatments you receive can increase your risk for falls. Some additional risk factors for falls in a hospital include:  · Being in an unfamiliar environment.  · Being on bed rest.  · Your surgery.  · Taking certain medicines.  · Your tubing requirements, such as intravenous (IV) therapy or catheters.  It is important that you learn how to decrease fall risks while at the hospital. Below are important tips that can help prevent falls.  SAFETY TIPS FOR PREVENTING FALLS  Talk about your risk of falling.  · Ask your health care provider why you are at risk for falling. Is it your medicine, illness, tubing placement, or something else?  · Make a plan with your health care  provider to keep you safe from falls.  · Ask your health care provider or pharmacist about side effects of your medicines. Some medicines can make you dizzy or affect your coordination.  Ask for help.  · Ask for help before getting out of bed. You may need to press your call button.  · Ask for assistance in getting safely to the toilet.  · Ask for a walker or cane to be put at your bedside. Ask that most of the side rails on your bed be placed up before your health care provider leaves the room.  · Ask family or friends to sit with you.  · Ask for things that are out of your reach, such as your glasses, hearing aids, telephone, bedside table, or call button.  Follow these tips to avoid falling:  · Stay lying or seated, rather than standing, while waiting for help.  · Wear rubber-soled slippers or shoes whenever you walk in the hospital.  · Avoid quick, sudden movements.  ¨ Change positions slowly.  ¨ Sit on the side of your bed before standing.  ¨ Stand up slowly and wait before you start to walk.  · Let your health care provider know if there is a spill on the floor.  · Pay careful attention to the medical equipment, electrical cords, and tubes around you.  · When you need help, use your call button by your bed or in the bathroom. Wait for one of your health care providers to help you.  · If you feel dizzy or unsure of your footing, return to bed and wait for assistance.  · Avoid being distracted by the TV, telephone, or another person in your room.  · Do not lean or support yourself on rolling objects, such as IV poles or bedside tables.     This information is not intended to replace advice given to you by your health care provider. Make sure you discuss any questions you have with your health care provider.     Document Released: 12/15/2001 Document Revised: 01/08/2016 Document Reviewed: 08/25/2013  [employer] Interactive Patient Education ©2016 [employer]       Surgical Site Infections FAQs  What is a Surgical  Site Infection (SSI)?  A surgical site infection is an infection that occurs after surgery in the part of the body where the surgery took place. Most patients who have surgery do not develop an infection. However, infections develop in about 1 to 3 out of every 100 patients who have surgery.  Some of the common symptoms of a surgical site infection are:  · Redness and pain around the area where you had surgery  · Drainage of cloudy fluid from your surgical wound  · Fever  Can SSIs be treated?  Yes. Most surgical site infections can be treated with antibiotics. The antibiotic given to you depends on the bacteria (germs) causing the infection. Sometimes patients with SSIs also need another surgery to treat the infection.  What are some of the things that hospitals are doing to prevent SSIs?  To prevent SSIs, doctors, nurses, and other healthcare providers:  · Clean their hands and arms up to their elbows with an antiseptic agent just before the surgery.  · Clean their hands with soap and water or an alcohol-based hand rub before and after caring for each patient.  · May remove some of your hair immediately before your surgery using electric clippers if the hair is in the same area where the procedure will occur. They should not shave you with a razor.  · Wear special hair covers, masks, gowns, and gloves during surgery to keep the surgery area clean.  · Give you antibiotics before your surgery starts. In most cases, you should get antibiotics within 60 minutes before the surgery starts and the antibiotics should be stopped within 24 hours after surgery.  · Clean the skin at the site of your surgery with a special soap that kills germs.  What can I do to help prevent SSIs?  Before your surgery:  · Tell your doctor about other medical problems you may have. Health problems such as allergies, diabetes, and obesity could affect your surgery and your treatment.  · Quit smoking. Patients who smoke get more infections. Talk  to your doctor about how you can quit before your surgery.  · Do not shave near where you will have surgery. Shaving with a razor can irritate your skin and make it easier to develop an infection.  At the time of your surgery:  · Speak up if someone tries to shave you with a razor before surgery. Ask why you need to be shaved and talk with your surgeon if you have any concerns.  · Ask if you will get antibiotics before surgery.  After your surgery:  · Make sure that your healthcare providers clean their hands before examining you, either with soap and water or an alcohol-based hand rub.  · If you do not see your providers clean their hands, please ask them to do so.  · Family and friends who visit you should not touch the surgical wound or dressings.  · Family and friends should clean their hands with soap and water or an alcohol-based hand rub before and after visiting you. If you do not see them clean their hands, ask them to clean their hands.  What do I need to do when I go home from the hospital?  · Before you go home, your doctor or nurse should explain everything you need to know about taking care of your wound. Make sure you understand how to care for your wound before you leave the hospital.  · Always clean your hands before and after caring for your wound.  · Before you go home, make sure you know who to contact if you have questions or problems after you get home.  · If you have any symptoms of an infection, such as redness and pain at the surgery site, drainage, or fever, call your doctor immediately.  If you have additional questions, please ask your doctor or nurse.  Developed and co-sponsored by The Society for Healthcare Epidemiology of [place] (SHEA); Infectious Diseases Society of [place] (IDSA); American Hospital Association; Association for Professionals in Infection Control and Epidemiology (APIC); Centers for Disease Control and Prevention (CDC); and The Joint Commission.     This information  is not intended to replace advice given to you by your health care provider. Make sure you discuss any questions you have with your health care provider.     Document Released: 12/23/2014 Document Revised: 01/08/2016 Document Reviewed: 03/02/2016  [employer] Interactive Patient Education ©2016 [employer]     PATIENT/FAMILY/RESPONSIBLE PARTY VERBALIZES UNDERSTANDING OF ABOVE EDUCATION.  COPY OF PAIN SCALE GIVEN AND REVIEWED WITH VERBALIZED UNDERSTANDING.

## 2018-09-10 ENCOUNTER — RESULTS ENCOUNTER (OUTPATIENT)
Dept: OBSTETRICS AND GYNECOLOGY | Facility: CLINIC | Age: 73
End: 2018-09-10

## 2018-09-10 DIAGNOSIS — Z01.818 PRE-OP EXAMINATION: ICD-10-CM

## 2018-09-17 ENCOUNTER — HOSPITAL ENCOUNTER (OUTPATIENT)
Facility: HOSPITAL | Age: 73
Discharge: HOME | End: 2018-09-18
Attending: OBSTETRICS & GYNECOLOGY | Admitting: OBSTETRICS & GYNECOLOGY

## 2018-09-17 ENCOUNTER — ANESTHESIA (OUTPATIENT)
Dept: PERIOP | Facility: HOSPITAL | Age: 73
End: 2018-09-17

## 2018-09-17 ENCOUNTER — ANESTHESIA EVENT (OUTPATIENT)
Dept: PERIOP | Facility: HOSPITAL | Age: 73
End: 2018-09-17

## 2018-09-17 DIAGNOSIS — Z01.818 PRE-OP EXAMINATION: ICD-10-CM

## 2018-09-17 DIAGNOSIS — N81.10 VAGINAL PROLAPSE: Primary | ICD-10-CM

## 2018-09-17 PROBLEM — N81.6 RECTOCELE: Status: ACTIVE | Noted: 2018-09-17

## 2018-09-17 LAB
ABO GROUP BLD: NORMAL
BLD GP AB SCN SERPL QL: NEGATIVE
RH BLD: POSITIVE
T&S EXPIRATION DATE: NORMAL

## 2018-09-17 PROCEDURE — 63710000001 ACETAMINOPHEN 500 MG TABLET: Performed by: ANESTHESIOLOGY

## 2018-09-17 PROCEDURE — 25010000002 DEXAMETHASONE PER 1 MG: Performed by: ANESTHESIOLOGY

## 2018-09-17 PROCEDURE — 63710000001 PHENAZOPYRIDINE 200 MG TABLET: Performed by: OBSTETRICS & GYNECOLOGY

## 2018-09-17 PROCEDURE — 25810000003 SODIUM CHLORIDE 0.9 % WITH KCL 20 MEQ 20-0.9 MEQ/L-% SOLUTION: Performed by: OBSTETRICS & GYNECOLOGY

## 2018-09-17 PROCEDURE — C1771 REP DEV, URINARY, W/SLING: HCPCS | Performed by: OBSTETRICS & GYNECOLOGY

## 2018-09-17 PROCEDURE — 86900 BLOOD TYPING SEROLOGIC ABO: CPT | Performed by: OBSTETRICS & GYNECOLOGY

## 2018-09-17 PROCEDURE — 57288 REPAIR BLADDER DEFECT: CPT | Performed by: OBSTETRICS & GYNECOLOGY

## 2018-09-17 PROCEDURE — 25010000002 METOCLOPRAMIDE PER 10 MG: Performed by: ANESTHESIOLOGY

## 2018-09-17 PROCEDURE — A9270 NON-COVERED ITEM OR SERVICE: HCPCS | Performed by: ANESTHESIOLOGY

## 2018-09-17 PROCEDURE — 25010000002 PROPOFOL 10 MG/ML EMULSION: Performed by: NURSE ANESTHETIST, CERTIFIED REGISTERED

## 2018-09-17 PROCEDURE — 94799 UNLISTED PULMONARY SVC/PX: CPT

## 2018-09-17 PROCEDURE — 63710000001 OXYCODONE-ACETAMINOPHEN 10-325 MG TABLET: Performed by: ANESTHESIOLOGY

## 2018-09-17 PROCEDURE — 57260 CMBN ANT PST COLPRHY: CPT | Performed by: OBSTETRICS & GYNECOLOGY

## 2018-09-17 PROCEDURE — 63710000001 CONJUGATED ESTROGENS 0.625 MG/GM CREAM 30 G TUBE: Performed by: OBSTETRICS & GYNECOLOGY

## 2018-09-17 PROCEDURE — 25010000002 FENTANYL CITRATE (PF) 250 MCG/5ML SOLUTION: Performed by: NURSE ANESTHETIST, CERTIFIED REGISTERED

## 2018-09-17 PROCEDURE — 25010000003 CEFAZOLIN PER 500 MG: Performed by: OBSTETRICS & GYNECOLOGY

## 2018-09-17 PROCEDURE — A9270 NON-COVERED ITEM OR SERVICE: HCPCS | Performed by: OBSTETRICS & GYNECOLOGY

## 2018-09-17 PROCEDURE — 86901 BLOOD TYPING SEROLOGIC RH(D): CPT | Performed by: OBSTETRICS & GYNECOLOGY

## 2018-09-17 PROCEDURE — 25010000002 MIDAZOLAM PER 1 MG: Performed by: ANESTHESIOLOGY

## 2018-09-17 PROCEDURE — 86850 RBC ANTIBODY SCREEN: CPT | Performed by: OBSTETRICS & GYNECOLOGY

## 2018-09-17 DEVICE — SIS SYSTEM
Type: IMPLANTABLE DEVICE | Status: FUNCTIONAL
Brand: SOLYX™ SIS SYSTEM

## 2018-09-17 RX ORDER — MIDAZOLAM HYDROCHLORIDE 1 MG/ML
2 INJECTION INTRAMUSCULAR; INTRAVENOUS
Status: DISCONTINUED | OUTPATIENT
Start: 2018-09-17 | End: 2018-09-17 | Stop reason: HOSPADM

## 2018-09-17 RX ORDER — OXYCODONE AND ACETAMINOPHEN 10; 325 MG/1; MG/1
1 TABLET ORAL EVERY 4 HOURS PRN
Status: DISCONTINUED | OUTPATIENT
Start: 2018-09-17 | End: 2018-09-18 | Stop reason: HOSPADM

## 2018-09-17 RX ORDER — ACETAMINOPHEN 500 MG
1000 TABLET ORAL ONCE
Status: COMPLETED | OUTPATIENT
Start: 2018-09-17 | End: 2018-09-17

## 2018-09-17 RX ORDER — METOCLOPRAMIDE HYDROCHLORIDE 5 MG/ML
10 INJECTION INTRAMUSCULAR; INTRAVENOUS ONCE AS NEEDED
Status: COMPLETED | OUTPATIENT
Start: 2018-09-17 | End: 2018-09-17

## 2018-09-17 RX ORDER — IBUPROFEN 600 MG/1
600 TABLET ORAL ONCE AS NEEDED
Status: DISCONTINUED | OUTPATIENT
Start: 2018-09-17 | End: 2018-09-17 | Stop reason: HOSPADM

## 2018-09-17 RX ORDER — SODIUM CHLORIDE, SODIUM LACTATE, POTASSIUM CHLORIDE, CALCIUM CHLORIDE 600; 310; 30; 20 MG/100ML; MG/100ML; MG/100ML; MG/100ML
1000 INJECTION, SOLUTION INTRAVENOUS CONTINUOUS
Status: DISCONTINUED | OUTPATIENT
Start: 2018-09-17 | End: 2018-09-17 | Stop reason: HOSPADM

## 2018-09-17 RX ORDER — SODIUM CHLORIDE 0.9 % (FLUSH) 0.9 %
3 SYRINGE (ML) INJECTION AS NEEDED
Status: DISCONTINUED | OUTPATIENT
Start: 2018-09-17 | End: 2018-09-17 | Stop reason: HOSPADM

## 2018-09-17 RX ORDER — MEPERIDINE HYDROCHLORIDE 25 MG/ML
12.5 INJECTION INTRAMUSCULAR; INTRAVENOUS; SUBCUTANEOUS
Status: DISCONTINUED | OUTPATIENT
Start: 2018-09-17 | End: 2018-09-17 | Stop reason: HOSPADM

## 2018-09-17 RX ORDER — ONDANSETRON 4 MG/1
4 TABLET, FILM COATED ORAL EVERY 6 HOURS PRN
Status: DISCONTINUED | OUTPATIENT
Start: 2018-09-17 | End: 2018-09-18 | Stop reason: HOSPADM

## 2018-09-17 RX ORDER — MIDAZOLAM HYDROCHLORIDE 1 MG/ML
1 INJECTION INTRAMUSCULAR; INTRAVENOUS
Status: DISCONTINUED | OUTPATIENT
Start: 2018-09-17 | End: 2018-09-17 | Stop reason: HOSPADM

## 2018-09-17 RX ORDER — CALCIUM CARBONATE 200(500)MG
2 TABLET,CHEWABLE ORAL 3 TIMES DAILY PRN
Status: DISCONTINUED | OUTPATIENT
Start: 2018-09-17 | End: 2018-09-18 | Stop reason: HOSPADM

## 2018-09-17 RX ORDER — FENTANYL CITRATE 50 UG/ML
INJECTION, SOLUTION INTRAMUSCULAR; INTRAVENOUS AS NEEDED
Status: DISCONTINUED | OUTPATIENT
Start: 2018-09-17 | End: 2018-09-17 | Stop reason: SURG

## 2018-09-17 RX ORDER — KETOROLAC TROMETHAMINE 30 MG/ML
15 INJECTION, SOLUTION INTRAMUSCULAR; INTRAVENOUS EVERY 6 HOURS PRN
Status: DISCONTINUED | OUTPATIENT
Start: 2018-09-17 | End: 2018-09-18 | Stop reason: HOSPADM

## 2018-09-17 RX ORDER — IPRATROPIUM BROMIDE AND ALBUTEROL SULFATE 2.5; .5 MG/3ML; MG/3ML
3 SOLUTION RESPIRATORY (INHALATION) ONCE AS NEEDED
Status: DISCONTINUED | OUTPATIENT
Start: 2018-09-17 | End: 2018-09-17 | Stop reason: HOSPADM

## 2018-09-17 RX ORDER — HYDROCHLOROTHIAZIDE 25 MG/1
12.5 TABLET ORAL DAILY
Status: DISCONTINUED | OUTPATIENT
Start: 2018-09-17 | End: 2018-09-18 | Stop reason: HOSPADM

## 2018-09-17 RX ORDER — SODIUM CHLORIDE 0.9 % (FLUSH) 0.9 %
1-10 SYRINGE (ML) INJECTION AS NEEDED
Status: DISCONTINUED | OUTPATIENT
Start: 2018-09-17 | End: 2018-09-17 | Stop reason: HOSPADM

## 2018-09-17 RX ORDER — SIMETHICONE 80 MG
80 TABLET,CHEWABLE ORAL 4 TIMES DAILY PRN
Status: DISCONTINUED | OUTPATIENT
Start: 2018-09-17 | End: 2018-09-18 | Stop reason: HOSPADM

## 2018-09-17 RX ORDER — LABETALOL HYDROCHLORIDE 5 MG/ML
5 INJECTION, SOLUTION INTRAVENOUS
Status: DISCONTINUED | OUTPATIENT
Start: 2018-09-17 | End: 2018-09-17 | Stop reason: HOSPADM

## 2018-09-17 RX ORDER — LEVOTHYROXINE SODIUM 0.12 MG/1
125 TABLET ORAL
Status: DISCONTINUED | OUTPATIENT
Start: 2018-09-18 | End: 2018-09-18 | Stop reason: HOSPADM

## 2018-09-17 RX ORDER — OXYCODONE AND ACETAMINOPHEN 10; 325 MG/1; MG/1
1 TABLET ORAL ONCE AS NEEDED
Status: COMPLETED | OUTPATIENT
Start: 2018-09-17 | End: 2018-09-17

## 2018-09-17 RX ORDER — ESTRADIOL 2 MG/1
2 TABLET ORAL DAILY
Status: DISCONTINUED | OUTPATIENT
Start: 2018-09-17 | End: 2018-09-18 | Stop reason: HOSPADM

## 2018-09-17 RX ORDER — ONDANSETRON 2 MG/ML
4 INJECTION INTRAMUSCULAR; INTRAVENOUS ONCE AS NEEDED
Status: DISCONTINUED | OUTPATIENT
Start: 2018-09-17 | End: 2018-09-17 | Stop reason: HOSPADM

## 2018-09-17 RX ORDER — ONDANSETRON 2 MG/ML
4 INJECTION INTRAMUSCULAR; INTRAVENOUS EVERY 6 HOURS PRN
Status: DISCONTINUED | OUTPATIENT
Start: 2018-09-17 | End: 2018-09-18 | Stop reason: HOSPADM

## 2018-09-17 RX ORDER — PHENAZOPYRIDINE HYDROCHLORIDE 200 MG/1
200 TABLET, FILM COATED ORAL ONCE
Status: COMPLETED | OUTPATIENT
Start: 2018-09-17 | End: 2018-09-17

## 2018-09-17 RX ORDER — IBUPROFEN 200 MG
400 TABLET ORAL EVERY 6 HOURS PRN
Status: DISCONTINUED | OUTPATIENT
Start: 2018-09-17 | End: 2018-09-18 | Stop reason: HOSPADM

## 2018-09-17 RX ORDER — PROPOFOL 10 MG/ML
VIAL (ML) INTRAVENOUS AS NEEDED
Status: DISCONTINUED | OUTPATIENT
Start: 2018-09-17 | End: 2018-09-17 | Stop reason: SURG

## 2018-09-17 RX ORDER — SODIUM CHLORIDE, SODIUM LACTATE, POTASSIUM CHLORIDE, CALCIUM CHLORIDE 600; 310; 30; 20 MG/100ML; MG/100ML; MG/100ML; MG/100ML
100 INJECTION, SOLUTION INTRAVENOUS CONTINUOUS
Status: DISCONTINUED | OUTPATIENT
Start: 2018-09-17 | End: 2018-09-17 | Stop reason: HOSPADM

## 2018-09-17 RX ORDER — CYCLOBENZAPRINE HCL 10 MG
10 TABLET ORAL 3 TIMES DAILY PRN
Status: DISCONTINUED | OUTPATIENT
Start: 2018-09-17 | End: 2018-09-18 | Stop reason: HOSPADM

## 2018-09-17 RX ORDER — METOCLOPRAMIDE HYDROCHLORIDE 5 MG/ML
5 INJECTION INTRAMUSCULAR; INTRAVENOUS
Status: DISCONTINUED | OUTPATIENT
Start: 2018-09-17 | End: 2018-09-17 | Stop reason: HOSPADM

## 2018-09-17 RX ORDER — HYDROCODONE BITARTRATE AND ACETAMINOPHEN 5; 325 MG/1; MG/1
1 TABLET ORAL EVERY 6 HOURS PRN
COMMUNITY
End: 2022-02-09

## 2018-09-17 RX ORDER — LIDOCAINE HYDROCHLORIDE AND EPINEPHRINE 10; 10 MG/ML; UG/ML
INJECTION, SOLUTION INFILTRATION; PERINEURAL AS NEEDED
Status: DISCONTINUED | OUTPATIENT
Start: 2018-09-17 | End: 2018-09-17 | Stop reason: HOSPADM

## 2018-09-17 RX ORDER — DIPHENHYDRAMINE HCL 25 MG
25 CAPSULE ORAL NIGHTLY PRN
Status: DISCONTINUED | OUTPATIENT
Start: 2018-09-17 | End: 2018-09-18 | Stop reason: HOSPADM

## 2018-09-17 RX ORDER — FENTANYL CITRATE 50 UG/ML
25 INJECTION, SOLUTION INTRAMUSCULAR; INTRAVENOUS AS NEEDED
Status: DISCONTINUED | OUTPATIENT
Start: 2018-09-17 | End: 2018-09-17 | Stop reason: HOSPADM

## 2018-09-17 RX ORDER — DEXAMETHASONE SODIUM PHOSPHATE 4 MG/ML
4 INJECTION, SOLUTION INTRA-ARTICULAR; INTRALESIONAL; INTRAMUSCULAR; INTRAVENOUS; SOFT TISSUE ONCE AS NEEDED
Status: COMPLETED | OUTPATIENT
Start: 2018-09-17 | End: 2018-09-17

## 2018-09-17 RX ORDER — DOCUSATE SODIUM 100 MG/1
100 CAPSULE, LIQUID FILLED ORAL 2 TIMES DAILY PRN
Status: DISCONTINUED | OUTPATIENT
Start: 2018-09-17 | End: 2018-09-18 | Stop reason: HOSPADM

## 2018-09-17 RX ORDER — MAGNESIUM HYDROXIDE 1200 MG/15ML
LIQUID ORAL AS NEEDED
Status: DISCONTINUED | OUTPATIENT
Start: 2018-09-17 | End: 2018-09-17 | Stop reason: HOSPADM

## 2018-09-17 RX ORDER — OXYCODONE AND ACETAMINOPHEN 7.5; 325 MG/1; MG/1
2 TABLET ORAL ONCE AS NEEDED
Status: DISCONTINUED | OUTPATIENT
Start: 2018-09-17 | End: 2018-09-17 | Stop reason: HOSPADM

## 2018-09-17 RX ORDER — SODIUM CHLORIDE AND POTASSIUM CHLORIDE 150; 900 MG/100ML; MG/100ML
100 INJECTION, SOLUTION INTRAVENOUS CONTINUOUS
Status: DISCONTINUED | OUTPATIENT
Start: 2018-09-17 | End: 2018-09-18 | Stop reason: HOSPADM

## 2018-09-17 RX ORDER — ONDANSETRON 4 MG/1
4 TABLET, ORALLY DISINTEGRATING ORAL EVERY 6 HOURS PRN
Status: DISCONTINUED | OUTPATIENT
Start: 2018-09-17 | End: 2018-09-18 | Stop reason: HOSPADM

## 2018-09-17 RX ORDER — OXYCODONE HYDROCHLORIDE AND ACETAMINOPHEN 5; 325 MG/1; MG/1
1 TABLET ORAL EVERY 4 HOURS PRN
Status: DISCONTINUED | OUTPATIENT
Start: 2018-09-17 | End: 2018-09-18 | Stop reason: HOSPADM

## 2018-09-17 RX ORDER — NALOXONE HCL 0.4 MG/ML
0.4 VIAL (ML) INJECTION AS NEEDED
Status: DISCONTINUED | OUTPATIENT
Start: 2018-09-17 | End: 2018-09-17 | Stop reason: HOSPADM

## 2018-09-17 RX ORDER — DIPHENHYDRAMINE HYDROCHLORIDE 50 MG/ML
25 INJECTION INTRAMUSCULAR; INTRAVENOUS NIGHTLY PRN
Status: DISCONTINUED | OUTPATIENT
Start: 2018-09-17 | End: 2018-09-18 | Stop reason: HOSPADM

## 2018-09-17 RX ADMIN — MIDAZOLAM HYDROCHLORIDE 2 MG: 1 INJECTION, SOLUTION INTRAMUSCULAR; INTRAVENOUS at 13:12

## 2018-09-17 RX ADMIN — PHENAZOPYRIDINE HYDROCHLORIDE 200 MG: 200 TABLET, FILM COATED ORAL at 11:12

## 2018-09-17 RX ADMIN — LIDOCAINE HYDROCHLORIDE 0.5 ML: 10 INJECTION, SOLUTION EPIDURAL; INFILTRATION; INTRACAUDAL; PERINEURAL at 11:30

## 2018-09-17 RX ADMIN — SODIUM CHLORIDE, POTASSIUM CHLORIDE, SODIUM LACTATE AND CALCIUM CHLORIDE: 600; 310; 30; 20 INJECTION, SOLUTION INTRAVENOUS at 13:40

## 2018-09-17 RX ADMIN — POTASSIUM CHLORIDE AND SODIUM CHLORIDE 100 ML/HR: 900; 150 INJECTION, SOLUTION INTRAVENOUS at 17:05

## 2018-09-17 RX ADMIN — DEXAMETHASONE SODIUM PHOSPHATE 4 MG: 4 INJECTION, SOLUTION INTRA-ARTICULAR; INTRALESIONAL; INTRAMUSCULAR; INTRAVENOUS; SOFT TISSUE at 13:12

## 2018-09-17 RX ADMIN — METOCLOPRAMIDE 10 MG: 5 INJECTION, SOLUTION INTRAMUSCULAR; INTRAVENOUS at 13:12

## 2018-09-17 RX ADMIN — CEFAZOLIN 2 G: 330 INJECTION, POWDER, FOR SOLUTION INTRAMUSCULAR; INTRAVENOUS at 13:40

## 2018-09-17 RX ADMIN — CEFAZOLIN 2 G: 330 INJECTION, POWDER, FOR SOLUTION INTRAMUSCULAR; INTRAVENOUS at 21:28

## 2018-09-17 RX ADMIN — OXYCODONE HYDROCHLORIDE AND ACETAMINOPHEN 1 TABLET: 10; 325 TABLET ORAL at 15:26

## 2018-09-17 RX ADMIN — FENTANYL CITRATE 250 MCG: 50 INJECTION INTRAMUSCULAR; INTRAVENOUS at 13:42

## 2018-09-17 RX ADMIN — SODIUM CHLORIDE, POTASSIUM CHLORIDE, SODIUM LACTATE AND CALCIUM CHLORIDE 1000 ML: 600; 310; 30; 20 INJECTION, SOLUTION INTRAVENOUS at 11:30

## 2018-09-17 RX ADMIN — PROPOFOL 200 MG: 10 INJECTION, EMULSION INTRAVENOUS at 13:42

## 2018-09-17 RX ADMIN — ACETAMINOPHEN 1000 MG: 500 TABLET, FILM COATED ORAL at 13:12

## 2018-09-17 NOTE — H&P (VIEW-ONLY)
Subjective     Chief Complaint   Patient presents with   • Results     pt here today for urodynamics results. pt has tried using a pessary and says that it fell out the first time she urinated. pt does have trouble emptying her bladder. pt also says she has trouble with passing stool. pt voices no other concerns.        Mary Ann Jones is a 72 y.o. year old who presents to be seen for pelvic prolapse.      The patient is s/p hysterectomy.  She reports positive vaginal pressure, urinary incontinence, sensation of incomplete bladder emptying and stool trapping, but denies pain with intercourse.  The patient feels like the problem began several years ago.  She is currently sexually active, and is interested in being sexually active in the future.  Mary Ann Jones has not had surgery for this problem in the past.    Past Medical History:   Diagnosis Date   • Allergic rhinitis    • Arthritis    • Chronic back pain    • Depression    • GERD (gastroesophageal reflux disease)    • Hypothyroidism    • Insomnia    • Melanoma (CMS/HCC)    • Thyroid cancer (CMS/HCC)        Current Outpatient Prescriptions:   •  cyclobenzaprine (FLEXERIL) 10 MG tablet, Take 10 mg by mouth 3 (Three) Times a Day As Needed for Muscle Spasms., Disp: , Rfl:   •  diazePAM (VALIUM) 5 MG tablet, Take 5 mg by mouth Daily As Needed for Anxiety., Disp: , Rfl:   •  estradiol (ESTRACE) 0.5 MG tablet, Take 0.5 mg by mouth Daily., Disp: , Rfl:   •  estradiol (ESTRACE) 2 MG tablet, , Disp: , Rfl:   •  hydrochlorothiazide (HYDRODIURIL) 12.5 MG tablet, , Disp: , Rfl:   •  levothyroxine (SYNTHROID, LEVOTHROID) 125 MCG tablet, Take 1 tablet by mouth Daily., Disp: 90 tablet, Rfl: 3  •  naproxen sodium (ALEVE) 220 MG tablet, Take 220 mg by mouth., Disp: , Rfl:   Family History   Problem Relation Age of Onset   • Cancer Mother         breast   • Alzheimer's disease Father      Social History     Social History   • Marital status:      Social History Main  "Topics   • Smoking status: Former Smoker     Years: 30.00     Types: Cigarettes   • Smokeless tobacco: Never Used   • Alcohol use Yes      Comment: socially   • Drug use: No   • Sexual activity: Defer     Other Topics Concern   • Not on file     No Known Allergies    Family History   Problem Relation Age of Onset   • Cancer Mother         breast   • Alzheimer's disease Father      Review of Systems   Constitutional: Negative for activity change and unexpected weight change.   Respiratory: Negative for shortness of breath.    Cardiovascular: Negative for chest pain.   Gastrointestinal: Negative for abdominal pain.   Genitourinary: Positive for difficulty urinating, enuresis and vaginal pain (pressure). Negative for dyspareunia, dysuria, pelvic pain, vaginal bleeding and vaginal discharge.   Musculoskeletal: Positive for arthralgias and back pain.           Objective   /82   Ht 162.6 cm (64\")   Wt 61.7 kg (136 lb)   BMI 23.34 kg/m²      Physical Exam   Constitutional: She is oriented to person, place, and time. She appears well-developed and well-nourished. No distress.   HENT:   Head: Normocephalic and atraumatic.   Eyes: EOM are normal.   Neck: Normal range of motion.   Pulmonary/Chest: Effort normal.   Abdominal: Soft. She exhibits no distension.   Genitourinary:   Genitourinary Comments: Normal external .  Good support of cuff.  Grade II cystocele, grade III rectocele.  Mucosa unremarkable.   Musculoskeletal: Normal range of motion.   Neurological: She is alert and oriented to person, place, and time.   Skin: Skin is warm and dry.   Psychiatric: She has a normal mood and affect. Her behavior is normal. Judgment normal.   Nursing note and vitals reviewed.      Imaging   No data reviewed       Assessment & Plan    Mary Ann was seen today for results.    Diagnoses and all orders for this visit:    Pre-op examination  -     Case Request; Standing  -     CBC and Differential; Future  -     ECG 12 Lead; " Future  -     ceFAZolin (ANCEF) 2 g in sodium chloride 0.9 % 100 mL IVPB; Infuse 2 g into a venous catheter Every 8 (Eight) Hours.  -     phenazopyridine (PYRIDIUM) tablet 200 mg; Take 2 tablets by mouth 1 (One) Time.  -     Case Request    Cystocele, midline: Anterior repair and/or Posterior repair were discussed with the patient as one options for management of her condition.  The patient was advised that expectant management or the use of a pessary were more conservative options and that surgery for prolapse is only necessary if the patient is having bladder or bowel dysfunction.  The patient feels that surgery is appropriate for her and wishes to proceed.  The surgical procedure, including risks and benefits, was described.  Post-op restrictions were reviewed.  All questions were answered to her satisfaction.      Rectocele    LISETH (stress urinary incontinence, female): Treatment options for the patient's stress urinary incontinence were reviewed to include PT for pelvic strengthening and a mid-urethral sling.  The patient was advised that the success rate of a mid-urethral sling is approximately 85%; in addition, she was also informed that studies have indicated PT to be equally successful if patients are compliant.  The risks of surgery were described to include the short term need for a camp catheter due to local swelling, the possible need to self-cath for three months if the sling is tensioned too tightly, and the possibility of surgical site infection.  Questions were answered, and the patient voiced understanding.  Will be scheduled with AR/MT    Other orders  -     Outpatient In A Bed; Standing  -     Follow Anesthesia Guidelines / Standing Orders; Future  -     Follow Anesthesia Guidelines / Standing Orders; Standing  -     Place sequential compression device; Standing  -     Type & Screen; Standing  -     Obtain informed consent; Standing          Margaux Pearson MD  9/5/2018  10:22 AM

## 2018-09-17 NOTE — PLAN OF CARE
Problem: Patient Care Overview  Goal: Plan of Care Review  Outcome: Ongoing (interventions implemented as appropriate)   09/17/18 4117   Coping/Psychosocial   Plan of Care Reviewed With patient   Plan of Care Review   Progress improving   OTHER   Outcome Summary VSS; camp patent; vaginal packing in place; IV infusing; denies pain at present; napping inbetween care at present; encouraged activity when awake et alert     Goal: Individualization and Mutuality  Outcome: Ongoing (interventions implemented as appropriate)    Goal: Discharge Needs Assessment  Outcome: Ongoing (interventions implemented as appropriate)      Problem: Pelvic Organ Prolapse, Surgical Repair (Adult)  Goal: Signs and Symptoms of Listed Potential Problems Will be Absent, Minimized or Managed (Pelvic Organ Prolapse, Surgical Repair)  Outcome: Ongoing (interventions implemented as appropriate)    Goal: Anesthesia/Sedation Recovery  Outcome: Outcome(s) achieved Date Met: 09/17/18      Problem: Fall Risk (Adult)  Goal: Identify Related Risk Factors and Signs and Symptoms  Outcome: Ongoing (interventions implemented as appropriate)    Goal: Absence of Fall  Outcome: Ongoing (interventions implemented as appropriate)

## 2018-09-17 NOTE — ANESTHESIA PROCEDURE NOTES
Airway  Urgency: elective    Airway not difficult    General Information and Staff    Patient location during procedure: OR  CR[facility]: [facility]    Indications and Patient Condition  Indications for airway management: airway protection    Preoxygenated: yes  MILS maintained throughout  Mask difficulty assessment: 1 - vent by mask    Final Airway Details  Final airway type: supraglottic airway      Successful airway: Supreme  Size 4    Number of attempts at approach: 1

## 2018-09-17 NOTE — ANESTHESIA POSTPROCEDURE EVALUATION
Patient: Mary Ann Jones    Procedure Summary     Date:  09/17/18 Room / Location:   [facility] OR  /  [facility] OR    Anesthesia Start:  1340 Anesthesia Stop:  1500    Procedures:       ANTERIOR AND POSTERIOR VAGINAL REPAIR (N/A Vagina)      MID-URETHRAL SLING WITH CYSTOSCOPY (N/A Vagina) Diagnosis:       Pre-op examination      (Pre-op examination [Z01.818])    Surgeon:  Margaux Pearson MD Provider:  Femi Lan CRNA    Anesthesia Type:  general ASA Status:  2          Anesthesia Type: general  Last vitals  BP   139/83 (09/17/18 1054)   Temp   97.3 °F (36.3 °C) (09/17/18 1054)   Pulse   78 (09/17/18 1232)   Resp   16 (09/17/18 1232)     SpO2   97 % (09/17/18 1232)     Anesthesia Post Evaluation

## 2018-09-17 NOTE — ANESTHESIA PREPROCEDURE EVALUATION
Anesthesia Evaluation     Patient summary reviewed   history of anesthetic complications:  NPO Solid Status: > 8 hours  NPO Liquid Status: > 8 hours           Airway   Mallampati: II  TM distance: >3 FB  Neck ROM: full  Dental          Pulmonary    (-) COPD, asthma, sleep apnea, not a smoker  Cardiovascular   Exercise tolerance: good (4-7 METS)    (+) hypertension,       Neuro/Psych  (-) seizures, TIA, CVA  GI/Hepatic/Renal/Endo    (+)  GERD,  hypothyroidism,   (-) liver disease, no renal disease, diabetes    Musculoskeletal     Abdominal    Substance History      OB/GYN          Other      history of cancer (thyroid)                    Anesthesia Plan    ASA 2     general     intravenous induction   Anesthetic plan, all risks, benefits, and alternatives have been provided, discussed and informed consent has been obtained with: patient.

## 2018-09-17 NOTE — OP NOTE
BH Sonido Jnoes  : 1945  MRN: [id number]  [facility]: [id number]  Date: 2018    Operative Note    ANTERIOR AND POSTERIOR VAGINAL REPAIR, MID-URETHRAL SLING WITH CYSTOSCOPY      Pre-op Diagnosis:  Cystocele  Rectocele  LISETH   Post-op Diagnosis:  Post-Op Diagnosis Codes:     Same   Procedure: Procedure(s):  ANTERIOR AND POSTERIOR VAGINAL REPAIR  MID-URETHRAL SLING WITH CYSTOSCOPY   Surgeon: Surgeon(s):  Margaux Pearson MD       Anesthesia: General   Estimated Blood Loss: 150   mls   Fluids: 900   mls   UOP: clear   mls   ABx: Ancef   Specimens:  none   Findings: Cystocele and rectocele.  Normal cysto   Complications: none     Description of Procedure:       The patient was taken to the OR, where she was prepped and draped in the usual sterile fashion in the dorsal lithotomy position using Sandoval stirrups.  The patient's bladder was drained using an in and out cath.  A Spears retractor was placed anteriorly to aid with visualization, and the posterior introitus was grasped with Allis clamps at 5 and 7 o'clock.  The posterior vaginal wall was injected with Pitressin in injectable saline.  A scalpel was used to make a midline incision the entire length of the posterior vaginal wall.  The edges of this incision were grasped with Allis clamps.  Metzenbaum scissors were used to dissect the excess vaginal mucosa off the underlying vaginal wall.  3-0 Vicryl pop-off sutures were used to create a midline plication and reduce the patient's posterior wall defect.  Excess vaginal mucosa was trimmed from the edges of the incision with curved Mccabe scissors.  The resulting incision edges were reapproximated in a proximal to distal fashion using a 2-0 Vicryl swedged-on suture in a running locked fashion.       Attention was then turned to the anterior vaginal wall by placing a weighted speculum in the posterior vagina and injecting the vaginal mucosa with Pitressin in injectable saline.  A midline surgical incision  was made the entire length of the anterior vaginal wall with a scalpel and the edges of this incision grasped with Allis clamps.  Again, the excess vaginal mucosa was dissected away from the underlying vaginal wall using Metzenbaum scissors.  Midline plication was performed using 3-0 Vicryl pop-off sutures in an interrupted fashion.  Excess vaginal mucosa was trimmed from the edges of the incision using curved Mccabe scissors.     At this point the patient's Solyx sling was to be placed.  A Arias catheter with a Arias catheter guide was used to deflect the patient's urethra toward the contralateral shoulder as the sling was being placed on both sides.  The Solyx mid-urethral sling was placed in a retropubic fashion, taking care to make sure that there was no twisting or wrinkling of the sling and making sure that the midline of the sling remained in the patient's anatomical midline.  The sling was tensioned so that a right angle clamp could just be placed behind it.       The entire length of the anterior vaginal wall incision was then reapproximated with 2-0 Vicryl suture in a running locked fashion.       Cystoscopy revealed a normal urinary bladder without any evidence of perforation by either the sling or the Solyx introducer.       At the conclusion of the case the patient had good anterior and posterior wall support.  The caliber of the vagina allowed 2 fingers, [id number], without any narrowing or stricturing at any point along the length of the vaginal canal.  The patient's vagina was filled with Premarin vaginal cream and vaginal packing material.  Sponge, lap and needle counts were correct ×2.  The patient tolerated the procedure well, and was taken to the recovery room in stable condition.      Margaux Pearson MD   9/17/2018  2:49 PM

## 2018-09-18 VITALS
BODY MASS INDEX: 23.05 KG/M2 | OXYGEN SATURATION: 94 % | WEIGHT: 135 LBS | HEART RATE: 75 BPM | RESPIRATION RATE: 18 BRPM | SYSTOLIC BLOOD PRESSURE: 113 MMHG | TEMPERATURE: 97.3 F | HEIGHT: 64 IN | DIASTOLIC BLOOD PRESSURE: 58 MMHG

## 2018-09-18 LAB
DEPRECATED RDW RBC AUTO: 40.1 FL (ref 40–54)
ERYTHROCYTE [DISTWIDTH] IN BLOOD BY AUTOMATED COUNT: 11.9 % (ref 12–15)
HCT VFR BLD AUTO: 33.6 % (ref 37–47)
HGB BLD-MCNC: 11.4 G/DL (ref 12–16)
MCH RBC QN AUTO: 31.1 PG (ref 28–32)
MCHC RBC AUTO-ENTMCNC: 33.9 G/DL (ref 33–36)
MCV RBC AUTO: 91.6 FL (ref 82–98)
PLATELET # BLD AUTO: 229 10*3/MM3 (ref 130–400)
PMV BLD AUTO: 8.8 FL (ref 6–12)
RBC # BLD AUTO: 3.67 10*6/MM3 (ref 4.2–5.4)
WBC NRBC COR # BLD: 9.61 10*3/MM3 (ref 4.8–10.8)

## 2018-09-18 PROCEDURE — 25010000002 KETOROLAC TROMETHAMINE PER 15 MG: Performed by: OBSTETRICS & GYNECOLOGY

## 2018-09-18 PROCEDURE — 51702 INSERT TEMP BLADDER CATH: CPT

## 2018-09-18 PROCEDURE — 25010000003 CEFAZOLIN PER 500 MG: Performed by: OBSTETRICS & GYNECOLOGY

## 2018-09-18 PROCEDURE — 85027 COMPLETE CBC AUTOMATED: CPT | Performed by: OBSTETRICS & GYNECOLOGY

## 2018-09-18 RX ORDER — LACTULOSE 20 G/30ML
20 SOLUTION ORAL 2 TIMES DAILY PRN
Qty: 946 ML | Refills: 1 | Status: SHIPPED | OUTPATIENT
Start: 2018-09-18 | End: 2018-10-26

## 2018-09-18 RX ORDER — OXYCODONE HYDROCHLORIDE AND ACETAMINOPHEN 5; 325 MG/1; MG/1
1-2 TABLET ORAL EVERY 4 HOURS PRN
Qty: 20 TABLET | Refills: 0 | Status: SHIPPED | OUTPATIENT
Start: 2018-09-18 | End: 2018-09-19

## 2018-09-18 RX ADMIN — KETOROLAC TROMETHAMINE 15 MG: 30 INJECTION, SOLUTION INTRAMUSCULAR; INTRAVENOUS at 03:59

## 2018-09-18 RX ADMIN — CEFAZOLIN 2 G: 330 INJECTION, POWDER, FOR SOLUTION INTRAMUSCULAR; INTRAVENOUS at 06:26

## 2018-09-18 NOTE — PROGRESS NOTES
Sonido Jones  : 1945  MRN: [id number]  [facility]: [id number]    Post-operative Day #1  Subjective   Medications being utilized for pain control: prescription NSAID's including ketorolac (Toradol).  Patient reports pain is well-controlled.  She is passing gas and has not had a bowel movement.  Patient is having spotting.  Her camp catheter has not been removed because patient would not let nurse take it out yet, but vaginal packing has already been removed.        Objective     Min/max vitals past 24 hours:   Temp  Min: 97.3 °F (36.3 °C)  Max: 99 °F (37.2 °C)  BP  Min: 109/59  Max: 153/72  Pulse  Min: 76  Max: 99  Resp  Min: 9  Max: 20        I/O last 3 completed shifts:  In: 1900 [I.V.:1900]  Out: 650 [Urine:650]    General: well developed; well nourished  no acute distress   Lungs:  Abdomen: breathing is unlabored  soft, non-tender; no masses   Pelvic: Not performed   Ext: Calves NT     WBC   Date/Time Value Ref Range Status   2018 0503 9.61 4.80 - 10.80 10*3/mm3 Final     Hemoglobin   Date/Time Value Ref Range Status   2018 0503 11.4 (L) 12.0 - 16.0 g/dL Final     Hematocrit   Date/Time Value Ref Range Status   2018 0503 33.6 (L) 37.0 - 47.0 % Final     Platelets   Date/Time Value Ref Range Status   2018 0503 229 130 - 400 10*3/mm3 Final        Assessment   1. Post-op Day #1 S/P AR/NE/solyx  2. Doing well, minimal pain     Plan   1. Discharge to home  2. D/C questions all answered  3. Follow-up appointment in 2 week(s).  4. Voiding trial before discharge    Margaux Pearson MD  2018  6:55 AM

## 2018-09-18 NOTE — DISCHARGE SUMMARY
BH Sonido Jones  : 1945  MRN: [id number]  CSN: [id number]    Discharge Summary      Date of Admission: 2018   Date of Discharge: 2018   Discharge Diagnosis: 1. S/p surgical correction of vaginal prolapse   Procedures Performed: Procedure(s):  ANTERIOR AND POSTERIOR VAGINAL REPAIR  MID-URETHRAL SLING WITH CYSTOSCOPY      Consults: none   Brief History: Patient is a 72 y.o.who presented for scheduled surgical correction.     Hospital Course: Uncomplicated.  Patient was ready for discharge on POD #1.    Pending Studies: None.     Condition at discharge: gradually improving   Discharge Medications:    Your medication list      START taking these medications      Instructions Last Dose Given Next Dose Due   lactulose 20 GM/30ML solution solution      Take 30 mL by mouth 2 (Two) Times a Day As Needed (constipation). Titrate as needed       oxyCODONE-acetaminophen 5-325 MG per tablet  Commonly known as:  PERCOCET      Take 1-2 tablets by mouth Every 4 (Four) Hours As Needed for Moderate Pain  or Severe Pain .          CONTINUE taking these medications      Instructions Last Dose Given Next Dose Due   cyclobenzaprine 10 MG tablet  Commonly known as:  FLEXERIL      Take 10 mg by mouth 3 (Three) Times a Day As Needed for Muscle Spasms.       diazePAM 5 MG tablet  Commonly known as:  VALIUM      Take 5 mg by mouth Daily As Needed for Anxiety.       estradiol 2 MG tablet  Commonly known as:  ESTRACE      Take 2 mg by mouth Daily.       hydrochlorothiazide 12.5 MG tablet  Commonly known as:  HYDRODIURIL      12.5 mg Daily.       HYDROcodone-acetaminophen 5-325 MG per tablet  Commonly known as:  NORCO      Take 1 tablet by mouth Every 6 (Six) Hours As Needed for Mild Pain  or Moderate Pain  (TAKES HALF A PILL).       levothyroxine 125 MCG tablet  Commonly known as:  SYNTHROID, LEVOTHROID      Take 1 tablet by mouth Daily.       naproxen sodium 220 MG tablet  Commonly known as:  ALEVE      Take  220 mg by mouth 2 (Two) Times a Day As Needed.             Where to Get Your Medications      You can get these medications from any pharmacy    Bring a paper prescription for each of these medications  · lactulose 20 GM/30ML solution solution  · oxyCODONE-acetaminophen 5-325 MG per tablet        Discharge Disposition: home   Follow-up: Future Appointments  Date Time Provider Department Center   10/3/2018 10:30 AM Margaux Pearson MD [facility] None            This note has been electronically signed.    Margaux Pearson MD  September 18, 2018  6:59 AM

## 2018-09-18 NOTE — PLAN OF CARE
Problem: Patient Care Overview  Goal: Plan of Care Review  Outcome: Ongoing (interventions implemented as appropriate)   09/18/18 0214   Coping/Psychosocial   Plan of Care Reviewed With patient   Plan of Care Review   Progress improving   OTHER   Outcome Summary VSS, camp with good uop, states she is sore but refuses pain meds, packing to be dc'd this am, waiting to talk to MD about camp as patients states that Korys told her she would go home with the camp for 7 days,      Goal: Individualization and Mutuality  Outcome: Ongoing (interventions implemented as appropriate)    Goal: Discharge Needs Assessment  Outcome: Ongoing (interventions implemented as appropriate)    Goal: Interprofessional Rounds/Family Conf  Outcome: Ongoing (interventions implemented as appropriate)      Problem: Pelvic Organ Prolapse, Surgical Repair (Adult)  Goal: Signs and Symptoms of Listed Potential Problems Will be Absent, Minimized or Managed (Pelvic Organ Prolapse, Surgical Repair)  Outcome: Ongoing (interventions implemented as appropriate)      Problem: Fall Risk (Adult)  Goal: Identify Related Risk Factors and Signs and Symptoms  Outcome: Ongoing (interventions implemented as appropriate)    Goal: Absence of Fall  Outcome: Ongoing (interventions implemented as appropriate)

## 2018-09-19 ENCOUNTER — OFFICE VISIT (OUTPATIENT)
Dept: OBSTETRICS AND GYNECOLOGY | Facility: CLINIC | Age: 73
End: 2018-09-19

## 2018-09-19 ENCOUNTER — TELEPHONE (OUTPATIENT)
Dept: OBSTETRICS AND GYNECOLOGY | Facility: CLINIC | Age: 73
End: 2018-09-19

## 2018-09-19 VITALS
HEIGHT: 64 IN | WEIGHT: 134 LBS | SYSTOLIC BLOOD PRESSURE: 140 MMHG | BODY MASS INDEX: 22.88 KG/M2 | DIASTOLIC BLOOD PRESSURE: 72 MMHG

## 2018-09-19 DIAGNOSIS — Z09 S/P GYNECOLOGICAL SURGERY, FOLLOW-UP EXAM: Primary | ICD-10-CM

## 2018-09-19 PROCEDURE — 99024 POSTOP FOLLOW-UP VISIT: CPT | Performed by: OBSTETRICS & GYNECOLOGY

## 2018-09-19 NOTE — PROGRESS NOTES
"Subjective   Chief Complaint   Patient presents with   • Post-op     pt here today for 2 day post op AR/FL and solyx sling. pt c/o bleeding and clotting. pt still has in catherer from surgery. pt voices no other concerns.      Mary Ann Jones is a 72 y.o. year old  presenting to be seen for her post-operative visit.  She had a repair of cystocele (AR), repair of rectocele (FL) and Solyx mid-urethral sling for stress incontinence 2 days ago.  Currently she reports vaginal bleeding that requires a pad and some small clots when she uses the bathroom and wipes.  Patient came in today to see if this was acceptable.  Pain is well-managed.    No Additional Complaints Reported    The following portions of the patient's history were reviewed and updated as appropriate:current medications and allergies    Review of Systems      Objective   /72   Ht 162.6 cm (64\")   Wt 60.8 kg (134 lb)   BMI 23.00 kg/m²     General:  well developed; well nourished  no acute distress   Abdomen: soft, non-tender; no masses   Pelvis: Clinical staff was present for exam.  Patient with camp to legbag.  Anterior and posterior wall incisions both well-approximated.  Small blood in vagina.  Pad inspected, only slightly stained.          Assessment   1. Pt is 2 days s/p repair of cystocele (AR), repair of rectocele (FL) and Solyx mid-urethral sling for stress incontinence  2. Patient reassured that current level of bleeding is acceptable and will decrease every day.     Plan   1. RTO Friday for catheter removal.    No orders of the defined types were placed in this encounter.         This note was electronically signed.    Margaux Pearson MD  2018  "

## 2018-09-19 NOTE — TELEPHONE ENCOUNTER
"Pt called she was d/c yesterday had surgery for vaginal prolapse Monday. She is saying she is having heavy bleeding that started as spotting when she left the hospital but she now describes has \"heavy period bleeding\" she has had to change her pad 2x since last night. No c/o pain. No blood in cath bag. Spoke with Dr Pearson and pt is to come in to be seen. Pt lives in [facility] [place] and said she will be bringing herself. I told her to head this was now and we will see her in 30-45min.   "

## 2018-09-21 ENCOUNTER — CLINICAL SUPPORT (OUTPATIENT)
Dept: OBSTETRICS AND GYNECOLOGY | Facility: CLINIC | Age: 73
End: 2018-09-21

## 2018-09-21 NOTE — PROGRESS NOTES
Pt in office for cath removal. Instructed pt to drink fluids et if unable to void in 2-3 hours she is to return to office.

## 2018-10-03 ENCOUNTER — OFFICE VISIT (OUTPATIENT)
Dept: OBSTETRICS AND GYNECOLOGY | Facility: CLINIC | Age: 73
End: 2018-10-03

## 2018-10-03 VITALS
DIASTOLIC BLOOD PRESSURE: 80 MMHG | WEIGHT: 131 LBS | HEIGHT: 64 IN | BODY MASS INDEX: 22.36 KG/M2 | SYSTOLIC BLOOD PRESSURE: 126 MMHG

## 2018-10-03 DIAGNOSIS — Z09 S/P GYNECOLOGICAL SURGERY, FOLLOW-UP EXAM: Primary | ICD-10-CM

## 2018-10-03 PROCEDURE — 99024 POSTOP FOLLOW-UP VISIT: CPT | Performed by: OBSTETRICS & GYNECOLOGY

## 2018-10-03 NOTE — PROGRESS NOTES
"Subjective   Chief Complaint   Patient presents with   • Post-op     pt here today for 2 week post op anterior and posterior vaginal repair, solyx sling. pt says that she is still having some bleeding. pt says she is also feeling extremely tired. pt voices no other concerns.      Mary Ann oJnes is a 72 y.o. year old  presenting to be seen for her post-operative visit.  She had a repair of cystocele (AR), repair of rectocele (SC) and Solyx mid-urethral sling for stress incontinence 2 weeks ago.  Currently she reports that she never really had pain after surgery, but she does still have pressure.  She is having slight vaginal bleeding.  Bladder function ok, but she does have urgency.  She reports that she has not had any further leakage since surgery.    No Additional Complaints Reported    The following portions of the patient's history were reviewed and updated as appropriate:current medications and allergies    Review of Systems   Respiratory: Negative for shortness of breath.    Cardiovascular: Negative for chest pain.   Gastrointestinal: Negative for abdominal pain.   Genitourinary: Positive for urgency and vaginal pain (\"pressure\"). Negative for difficulty urinating and enuresis (resolved since Solyx sling).         Objective   /80   Ht 162.6 cm (64\")   Wt 59.4 kg (131 lb)   BMI 22.49 kg/m²     General:  well developed; well nourished  no acute distress   Abdomen: soft, non-tender; no masses   Pelvis: Clinical staff was present for exam  Vaginal:  normal pink mucosa without prolapse or lesions.  Incisions healing well, vaginal length and caliber good.  Exam non-tender          Assessment   1. Pt is 2 weeks s/p repair of cystocele (AR), repair of rectocele (SC) and Solyx mid-urethral sling for stress incontinence  2. Doing well, but still feeling very fatigued  3. LISETH completely resolved     Plan   1. OK to mow on riding mower  2. RTO in 4 weeks.    No orders of the defined types were placed " in this encounter.         This note was electronically signed.    Margaux Pearson MD  October 3, 2018

## 2018-10-26 ENCOUNTER — OFFICE VISIT (OUTPATIENT)
Dept: OBSTETRICS AND GYNECOLOGY | Facility: CLINIC | Age: 73
End: 2018-10-26

## 2018-10-26 VITALS
BODY MASS INDEX: 23.22 KG/M2 | HEIGHT: 64 IN | SYSTOLIC BLOOD PRESSURE: 126 MMHG | DIASTOLIC BLOOD PRESSURE: 62 MMHG | WEIGHT: 136 LBS

## 2018-10-26 DIAGNOSIS — Z09 S/P GYNECOLOGICAL SURGERY, FOLLOW-UP EXAM: Primary | ICD-10-CM

## 2018-10-26 DIAGNOSIS — R39.15 URINARY URGENCY: ICD-10-CM

## 2018-10-26 PROCEDURE — 99024 POSTOP FOLLOW-UP VISIT: CPT | Performed by: OBSTETRICS & GYNECOLOGY

## 2018-10-26 RX ORDER — PANTOPRAZOLE SODIUM 40 MG/1
TABLET, DELAYED RELEASE ORAL
COMMUNITY
Start: 2018-10-22 | End: 2019-02-19

## 2018-10-26 NOTE — PROGRESS NOTES
"Subjective   Chief Complaint   Patient presents with   • Post-op     pt here today for 6 week post op anterior and posterior vaginal repair. pt says that she has had some issues but wanting to know when she can go back to normal activity. pt voices no other concerns.      Mary Ann Jones is a 73 y.o. year old  presenting to be seen for her post-operative visit.  She had a repair of cystocele (AR) and repair of rectocele (IL) 6 weeks ago.  Currently she reports that she is not having any pain or VB.  She does still have discharge and wants to know if that is ok.  She also reports urinary urgency that is new compared to pre-operatively.  She is pleased to not have pressure from prolapse any more.    No Additional Complaints Reported    The following portions of the patient's history were reviewed and updated as appropriate:current medications and allergies    Review of Systems      Objective   /62   Ht 162.6 cm (64\")   Wt 61.7 kg (136 lb)   BMI 23.34 kg/m²     General:  well developed; well nourished  no acute distress   Abdomen: soft, non-tender; no masses   Pelvis: Clinical staff was present for exam.  Incisions well-approximated and healing; some fragment of suture still present.  Exam mildly tender.  No blood          Assessment   1. Pt is 6 weeks s/p repair of cystocele (AR) and repair of rectocele (IL)  2. Discussed that urinary urgency might be because bladder is now emptying more-easily, but could also be due to UTI since she had a camp after surgery     Plan   1. UA with C&S today.    2. No restrictions now    No orders of the defined types were placed in this encounter.         This note was electronically signed.    Margaux Pearson MD  2018  "

## 2018-10-28 LAB
BACTERIA UR CULT: NORMAL
BACTERIA UR CULT: NORMAL

## 2019-02-19 ENCOUNTER — TELEPHONE (OUTPATIENT)
Dept: CARDIOLOGY | Facility: CLINIC | Age: 74
End: 2019-02-19

## 2019-02-19 NOTE — TELEPHONE ENCOUNTER
She called concerning increasing soa with exertion and chest tightness in the last couple of months. We have not seen her since august 2017. I made her an appt on 4/26 @ 10:15 which is your first available appt. NP schedule does not have any sooner appt either.   I advised her to f/u with her pcp with current symptoms. She was also told to go to ER if symptoms worsen. She voiced understanding.

## 2019-02-20 ENCOUNTER — OFFICE VISIT (OUTPATIENT)
Dept: CARDIOLOGY | Facility: CLINIC | Age: 74
End: 2019-02-20

## 2019-02-20 VITALS
HEART RATE: 94 BPM | OXYGEN SATURATION: 95 % | SYSTOLIC BLOOD PRESSURE: 140 MMHG | HEIGHT: 64 IN | RESPIRATION RATE: 18 BRPM | WEIGHT: 134 LBS | BODY MASS INDEX: 22.88 KG/M2 | DIASTOLIC BLOOD PRESSURE: 80 MMHG

## 2019-02-20 DIAGNOSIS — R06.09 DYSPNEA ON EXERTION: ICD-10-CM

## 2019-02-20 DIAGNOSIS — I10 ESSENTIAL HYPERTENSION: ICD-10-CM

## 2019-02-20 DIAGNOSIS — I47.1 ATRIAL TACHYCARDIA (HCC): ICD-10-CM

## 2019-02-20 DIAGNOSIS — R07.89 OTHER CHEST PAIN: Primary | ICD-10-CM

## 2019-02-20 PROCEDURE — 93000 ELECTROCARDIOGRAM COMPLETE: CPT | Performed by: NURSE PRACTITIONER

## 2019-02-20 PROCEDURE — 99214 OFFICE O/P EST MOD 30 MIN: CPT | Performed by: NURSE PRACTITIONER

## 2019-02-20 NOTE — PROGRESS NOTES
"    Subjective:     Encounter Date:02/20/2019      Patient ID: Mary Ann Jones is a 73 y.o. female.    Chief Complaint:  Chest Pain    This is a new problem. The current episode started 1 to 4 weeks ago. The problem occurs intermittently. The problem has been unchanged. The pain is present in the lateral region. The pain is at a severity of 3/10. The quality of the pain is described as sharp. The pain does not radiate. Pertinent negatives include no abdominal pain, back pain, cough, dizziness, fever, headaches, hemoptysis, malaise/fatigue, nausea, near-syncope, orthopnea, palpitations, PND, shortness of breath, syncope or vomiting. The pain is aggravated by nothing. She has tried nothing for the symptoms.   Shortness of Breath   This is a new problem. The current episode started more than 1 month ago. Associated symptoms include chest pain. Pertinent negatives include no abdominal pain, fever, headaches, hemoptysis, leg swelling, orthopnea, PND, rash, syncope or vomiting. The symptoms are aggravated by exercise. The treatment provided moderate relief.   Hypertension   This is a chronic problem. The current episode started more than 1 year ago. The problem is controlled. Associated symptoms include chest pain. Pertinent negatives include no headaches, malaise/fatigue, orthopnea, palpitations, PND or shortness of breath. Past treatments include diuretics. The current treatment provides significant improvement.     Patient presents today for evaluation for chest pain. Patient was seen by his PCP who called to have patient added to our office today for chest pain. Patient was advised to go to the ER by PCP and our office but patient refused. Patient states over the last several weeks she has had 2-3 episodes of left sided, under breast chest pain. Very atypical- patient states it is sore to the touch now. She also has had dyspnea on exertion. She states with walking up her \"14 stairs\" at her house she is now very " short of breath- 6 months ago she could do this without any issue. She was initially seen in our office in June 2017 for atypical chest pain for which she had a low risk stress test. There was also a finding of atrial tachycardia on holter monitor- which she was asymptomatic to. She follows with Dr. Tuttle as her PCP who requested patient be added to the schedule today. No family history of CAD. History of hypertension, thryroid cancer and melanoma. History of chronic back pain.     The following portions of the patient's history were reviewed and updated as appropriate: allergies, current medications, past family history, past medical history, past social history, past surgical history and problem list.   Prior to Admission medications    Medication Sig Start Date End Date Taking? Authorizing Provider   cyclobenzaprine (FLEXERIL) 10 MG tablet Take 10 mg by mouth 3 (Three) Times a Day As Needed for Muscle Spasms.   Yes Donald Reyes MD   diazePAM (VALIUM) 5 MG tablet Take 5 mg by mouth Daily As Needed for Anxiety.   Yes Donald Reyes MD   estradiol (ESTRACE) 2 MG tablet Take 2 mg by mouth Daily. 7/5/18  Yes Donald Reyes MD   hydrochlorothiazide (HYDRODIURIL) 12.5 MG tablet 12.5 mg Daily. 8/16/18  Yes Donald Reyes MD   HYDROcodone-acetaminophen (NORCO) 5-325 MG per tablet Take 1 tablet by mouth Every 6 (Six) Hours As Needed for Mild Pain  or Moderate Pain  (TAKES HALF A PILL).   Yes Donald Reyes MD   levothyroxine (SYNTHROID, LEVOTHROID) 125 MCG tablet Take 1 tablet by mouth Daily. 12/6/17  Yes Danny East MD   naproxen sodium (ALEVE) 220 MG tablet Take 220 mg by mouth 2 (Two) Times a Day As Needed.   Yes Donald Reyes MD     Past Medical History:   Diagnosis Date   • Allergic rhinitis    • Arthritis    • Chronic back pain    • Depression    • GERD (gastroesophageal reflux disease)    • Hypertension     mild    • Hypothyroidism    • Insomnia    • Melanoma  (CMS/HCC)    • Muscle spasms of both lower extremities     and back    • PONV (postoperative nausea and vomiting)    • Thyroid cancer (CMS/HCC)        Review of Systems   Constitution: Negative for chills, decreased appetite, fever, malaise/fatigue, weight gain and weight loss.   HENT: Negative for nosebleeds.    Eyes: Negative for visual disturbance.   Cardiovascular: Positive for chest pain and dyspnea on exertion. Negative for leg swelling, near-syncope, orthopnea, palpitations, paroxysmal nocturnal dyspnea and syncope.   Respiratory: Negative for cough, hemoptysis, shortness of breath and snoring.    Endocrine: Negative for cold intolerance and heat intolerance.   Hematologic/Lymphatic: Negative for bleeding problem. Does not bruise/bleed easily.   Skin: Negative for rash.   Musculoskeletal: Negative for back pain and falls.   Gastrointestinal: Negative for abdominal pain, constipation, diarrhea, heartburn, melena, nausea and vomiting.   Genitourinary: Negative for hematuria.   Neurological: Negative for dizziness, headaches and light-headedness.   Psychiatric/Behavioral: Negative for altered mental status.   Allergic/Immunologic: Negative for persistent infections.         ECG 12 Lead  Date/Time: 2/20/2019 3:46 PM  Performed by: Óscar Anderson APRN  Authorized by: Óscar Anderson APRN   Comparison: compared with previous ECG from 9/7/2018  Similar to previous ECG  Rhythm: sinus rhythm               Objective:     Physical Exam   Constitutional: She is oriented to person, place, and time. She appears well-developed and well-nourished.   HENT:   Head: Normocephalic and atraumatic.   Eyes: Pupils are equal, round, and reactive to light.   Neck: Normal range of motion. Neck supple. No JVD present. Carotid bruit is not present.   Cardiovascular: Normal rate, regular rhythm, normal heart sounds and intact distal pulses.   Pulmonary/Chest: Effort normal and breath sounds normal.   Abdominal: Soft. Bowel sounds are  "normal.   Musculoskeletal: Normal range of motion.   Neurological: She is alert and oriented to person, place, and time. She has normal reflexes.   Skin: Skin is warm and dry.   Psychiatric: She has a normal mood and affect. Her behavior is normal. Judgment and thought content normal.     Blood pressure 140/80, pulse 94, resp. rate 18, height 162.6 cm (64\"), weight 60.8 kg (134 lb), SpO2 95 %    Lab Review:       Assessment:          Diagnosis Plan   1. Other chest pain  Adult Stress Echo W/ Cont or Stress Agent if Necessary Per Protocol   2. Dyspnea on exertion     3. Atrial tachycardia (CMS/HCC)     4. Essential hypertension            Plan:       1/2. Given chest pain and dyspnea on exertion will order stress echo. Though chest pain seems atypical as it is located under left breast and not exertion. However she is also experiencing dyspnea on exertion. Follow up in 2 months after stress  3. Atrial tachycardia- noted on holter monitor- patient denies any palpitations. She does report an occasional \"heart skip\"  4. Blood pressure controlled on HCTZ- borderline high today- defer to PCP for further treatment    Patient's Body mass index is 23 kg/m². BMI is within normal parameters. No follow-up required.   Former smoker many years ago. Smoked less than a pack per day for roughly 5 years       "

## 2019-02-25 ENCOUNTER — TELEPHONE (OUTPATIENT)
Dept: CARDIOLOGY | Facility: CLINIC | Age: 74
End: 2019-02-25

## 2019-02-25 NOTE — TELEPHONE ENCOUNTER
----- Message from LEYLA Hunt sent at 2/25/2019 10:16 AM CST -----  I reviewed EKG with her in office- which was normal. I assume you mean she is asking for results of her stress test which hasn't been read yet  ----- Message -----  From: Marjan Roach MA  Sent: 2/25/2019  10:01 AM  To: LEYLA Hunt    This pt called asking for EKG results. Please advise

## 2019-02-27 ENCOUNTER — HOSPITAL ENCOUNTER (OUTPATIENT)
Dept: CARDIOLOGY | Facility: HOSPITAL | Age: 74
Discharge: HOME | End: 2019-02-27
Admitting: NURSE PRACTITIONER

## 2019-02-27 VITALS
WEIGHT: 134.04 LBS | DIASTOLIC BLOOD PRESSURE: 81 MMHG | SYSTOLIC BLOOD PRESSURE: 145 MMHG | HEART RATE: 85 BPM | HEIGHT: 64 IN | BODY MASS INDEX: 22.88 KG/M2

## 2019-02-27 DIAGNOSIS — R07.89 OTHER CHEST PAIN: ICD-10-CM

## 2019-02-27 PROCEDURE — 25010000003 DOBUTAMINE PER 250 MG: Performed by: INTERNAL MEDICINE

## 2019-02-27 PROCEDURE — 93350 STRESS TTE ONLY: CPT

## 2019-02-27 PROCEDURE — 25010000002 PERFLUTREN 6.52 MG/ML SUSPENSION: Performed by: INTERNAL MEDICINE

## 2019-02-27 PROCEDURE — 93018 CV STRESS TEST I&R ONLY: CPT | Performed by: INTERNAL MEDICINE

## 2019-02-27 PROCEDURE — 93352 ADMIN ECG CONTRAST AGENT: CPT | Performed by: INTERNAL MEDICINE

## 2019-02-27 PROCEDURE — 93350 STRESS TTE ONLY: CPT | Performed by: INTERNAL MEDICINE

## 2019-02-27 PROCEDURE — 93017 CV STRESS TEST TRACING ONLY: CPT

## 2019-02-27 RX ORDER — DOBUTAMINE HYDROCHLORIDE 100 MG/100ML
10-50 INJECTION INTRAVENOUS CONTINUOUS
Status: DISCONTINUED | OUTPATIENT
Start: 2019-02-27 | End: 2019-02-28 | Stop reason: HOSPADM

## 2019-02-27 RX ADMIN — Medication 10 MCG/KG/MIN: at 09:16

## 2019-02-27 RX ADMIN — PERFLUTREN 8.48 MG: 6.52 INJECTION, SUSPENSION INTRAVENOUS at 09:17

## 2019-02-28 LAB
BH CV STRESS BP STAGE 1: NORMAL
BH CV STRESS BP STAGE 2: NORMAL
BH CV STRESS BP STAGE 3: NORMAL
BH CV STRESS BP STAGE 4: NORMAL
BH CV STRESS DOSE DOBUTAMINE STAGE 1: 10
BH CV STRESS DOSE DOBUTAMINE STAGE 2: 20
BH CV STRESS DOSE DOBUTAMINE STAGE 3: 30
BH CV STRESS DOSE DOBUTAMINE STAGE 4: 40
BH CV STRESS DURATION MIN STAGE 1: 3
BH CV STRESS DURATION MIN STAGE 2: 3
BH CV STRESS DURATION MIN STAGE 3: 3
BH CV STRESS DURATION MIN STAGE 4: 2
BH CV STRESS DURATION SEC STAGE 1: 0
BH CV STRESS DURATION SEC STAGE 2: 0
BH CV STRESS DURATION SEC STAGE 3: 0
BH CV STRESS DURATION SEC STAGE 4: 30
BH CV STRESS HR STAGE 1: 89
BH CV STRESS HR STAGE 2: 104
BH CV STRESS HR STAGE 3: 116
BH CV STRESS HR STAGE 4: 128
BH CV STRESS PROTOCOL 1: NORMAL
BH CV STRESS RECOVERY BP: NORMAL MMHG
BH CV STRESS RECOVERY HR: 96 BPM
BH CV STRESS STAGE 1: 1
BH CV STRESS STAGE 2: 2
BH CV STRESS STAGE 3: 3
BH CV STRESS STAGE 4: 4
MAXIMAL PREDICTED HEART RATE: 146 BPM
PERCENT MAX PREDICTED HR: 87.67 %
STRESS BASELINE BP: NORMAL MMHG
STRESS BASELINE HR: 85 BPM
STRESS PERCENT HR: 103 %
STRESS POST EXERCISE DUR MIN: 11 MIN
STRESS POST EXERCISE DUR SEC: 30 SEC
STRESS POST PEAK BP: NORMAL MMHG
STRESS POST PEAK HR: 128 BPM
STRESS TARGET HR: 124 BPM

## 2019-11-07 ENCOUNTER — OFFICE VISIT (OUTPATIENT)
Dept: OBSTETRICS AND GYNECOLOGY | Facility: CLINIC | Age: 74
End: 2019-11-07

## 2019-11-07 VITALS
SYSTOLIC BLOOD PRESSURE: 130 MMHG | DIASTOLIC BLOOD PRESSURE: 70 MMHG | HEIGHT: 64 IN | WEIGHT: 132 LBS | BODY MASS INDEX: 22.53 KG/M2

## 2019-11-07 DIAGNOSIS — K62.3 RECTAL MUCOSA PROLAPSE: Primary | ICD-10-CM

## 2019-11-07 DIAGNOSIS — Z09 S/P GYNECOLOGICAL SURGERY, FOLLOW-UP EXAM: ICD-10-CM

## 2019-11-07 DIAGNOSIS — K59.09 CHRONIC CONSTIPATION: ICD-10-CM

## 2019-11-07 PROCEDURE — 99213 OFFICE O/P EST LOW 20 MIN: CPT | Performed by: OBSTETRICS & GYNECOLOGY

## 2019-11-07 NOTE — PROGRESS NOTES
"Subjective   Chief Complaint   Patient presents with   • Vaginal Prolapse     pt here today with c/o prolapse. pt voices that she is having trouble passing stool. pt voices no other concerns.      Mary Ann Jones is a 74 y.o. year old .  No LMP recorded. Patient has had a hysterectomy.  She presents to be seen because \"something is wrong with my colon and I can't clean good\".  Patient has chronic constipation which she has struggled with for many years.  She uses stool softeners and fiber and suppositories to manage this.  The patient reports that sometimes her rectum \"turns inside out\" make it very hard for her to clean herself after bowel movements.  She also reports that it is uncomfortable when she feels like she is \"sitting on it\".  The patient was concerned that this was related to her previous vaginal repair and that her prolapse surgery was failing.  The patient is status post anterior and posterior repair with a select sling in 2018.  She denies any vaginal pressure or discomfort; they have resumed sexual intercourse and she reports that this is not painful.    The following portions of the patient's history were reviewed and updated as appropriate:current medications and allergies    Social History    Tobacco Use      Smoking status: Former Smoker        Years: 30.00        Types: Cigarettes      Smokeless tobacco: Never Used    Review of Systems   Constitutional: Negative for activity change and unexpected weight change.   Respiratory: Negative for shortness of breath.    Cardiovascular: Negative for chest pain.   Gastrointestinal: Positive for constipation (chronic).   Genitourinary: Negative for dyspareunia, pelvic pain, vaginal bleeding, vaginal discharge and vaginal pain.         Objective   /70   Ht 162.6 cm (64\")   Wt 59.9 kg (132 lb)   BMI 22.66 kg/m²     Physical Exam   Constitutional: She is oriented to person, place, and time. She appears well-developed and " well-nourished. No distress.   HENT:   Head: Normocephalic and atraumatic.   Eyes: EOM are normal.   Neck: Normal range of motion.   Pulmonary/Chest: Effort normal.   Abdominal: Soft. She exhibits no distension. There is no tenderness.   Genitourinary:   Genitourinary Comments: Patient still with good anterior and posterior vaginal support.  She has only mild discomfort on bimanual exam and tolerates speculum exam well.  Vaginal mucosa very pale with loss of rugae.  Rectal exam with normal tone and no hemorrhoids noted.  At present, there is no prolapse of the rectal mucosa, but the patient is not straining during exam.   Musculoskeletal: Normal range of motion.   Neurological: She is alert and oriented to person, place, and time.   Skin: Skin is warm and dry.   Psychiatric: She has a normal mood and affect. Her behavior is normal. Judgment normal.   Nursing note and vitals reviewed.      Lab Review   No data reviewed    Imaging   No data reviewed     Assessment & Plan    Mary Ann was seen today for vaginal prolapse.    Diagnoses and all orders for this visit:    Rectal mucosa prolapse: No evidence of rectal prolapse on exam today, but patient reports that it is exacerbated by having a bowel movement.  We have discussed conservative management with a bowel regimen and disposable hygiene wipes until it becomes significant enough to warrant surgery.  We have discussed referral to a colorectal surgeon in [place] should the patient ever need surgical correction in the future.    Chronic constipation: Patient reports she is managing well with her own regimen and declines referral to gastroenterology.    S/P gynecological surgery, follow-up exam: Patient still with good support status post anterior and posterior repair with select sling in September 2018.      This note was electronically signed.    Margaux Pearson MD  November 7, 2019  9:05 AM    Total time spent today with Mary Ann  was 20 minutes (level 3).  Greater than  50% of the time was spent coordinating care, answering her questions and counseling regarding pathophysiology of her presenting problem along with plans for any diagnositc work-up and treatment.

## 2020-08-18 ENCOUNTER — HOSPITAL ENCOUNTER (OUTPATIENT)
Dept: GENERAL RADIOLOGY | Facility: HOSPITAL | Age: 75
Discharge: HOME | End: 2020-08-18
Admitting: NURSE PRACTITIONER

## 2020-08-18 ENCOUNTER — TRANSCRIBE ORDERS (OUTPATIENT)
Dept: GENERAL RADIOLOGY | Facility: HOSPITAL | Age: 75
End: 2020-08-18

## 2020-08-18 DIAGNOSIS — M25.552 LEFT HIP PAIN: ICD-10-CM

## 2020-08-18 DIAGNOSIS — M25.552 LEFT HIP PAIN: Primary | ICD-10-CM

## 2020-08-18 PROCEDURE — 73502 X-RAY EXAM HIP UNI 2-3 VIEWS: CPT

## 2020-08-20 ENCOUNTER — HOSPITAL ENCOUNTER (EMERGENCY)
Facility: HOSPITAL | Age: 75
Discharge: HOME | End: 2020-08-20
Attending: EMERGENCY MEDICINE | Admitting: EMERGENCY MEDICINE

## 2020-08-20 ENCOUNTER — APPOINTMENT (OUTPATIENT)
Dept: CT IMAGING | Facility: HOSPITAL | Age: 75
End: 2020-08-20

## 2020-08-20 VITALS
RESPIRATION RATE: 16 BRPM | HEIGHT: 64 IN | WEIGHT: 127 LBS | TEMPERATURE: 97.6 F | DIASTOLIC BLOOD PRESSURE: 71 MMHG | SYSTOLIC BLOOD PRESSURE: 142 MMHG | BODY MASS INDEX: 21.68 KG/M2 | OXYGEN SATURATION: 93 % | HEART RATE: 68 BPM

## 2020-08-20 DIAGNOSIS — M54.32 SCIATICA OF LEFT SIDE: Primary | ICD-10-CM

## 2020-08-20 PROCEDURE — 99284 EMERGENCY DEPT VISIT MOD MDM: CPT

## 2020-08-20 PROCEDURE — 25010000002 DEXAMETHASONE PER 1 MG: Performed by: EMERGENCY MEDICINE

## 2020-08-20 PROCEDURE — 96372 THER/PROPH/DIAG INJ SC/IM: CPT

## 2020-08-20 PROCEDURE — 25010000002 MORPHINE PER 10 MG: Performed by: EMERGENCY MEDICINE

## 2020-08-20 PROCEDURE — 72131 CT LUMBAR SPINE W/O DYE: CPT

## 2020-08-20 PROCEDURE — 73700 CT LOWER EXTREMITY W/O DYE: CPT

## 2020-08-20 RX ORDER — DEXAMETHASONE SODIUM PHOSPHATE 4 MG/ML
4 INJECTION, SOLUTION INTRA-ARTICULAR; INTRALESIONAL; INTRAMUSCULAR; INTRAVENOUS; SOFT TISSUE ONCE
Status: COMPLETED | OUTPATIENT
Start: 2020-08-20 | End: 2020-08-20

## 2020-08-20 RX ADMIN — MORPHINE SULFATE 4 MG: 4 INJECTION, SOLUTION INTRAMUSCULAR; INTRAVENOUS at 07:31

## 2020-08-20 RX ADMIN — DEXAMETHASONE SODIUM PHOSPHATE 4 MG: 4 INJECTION, SOLUTION INTRA-ARTICULAR; INTRALESIONAL; INTRAMUSCULAR; INTRAVENOUS; SOFT TISSUE at 07:27

## 2020-08-20 NOTE — ED PROVIDER NOTES
Subjective   Patient is a 74-year-old female who presents to the ER with left hip pain.  Patient states she has had posterior left hip pain for the last month, progressively worsening over the last week.  Patient describes it as an achy pain worse with any sort of movement.  Patient states she cannot bear any weight on the left lower extremity due to pain.  Patient had an x-ray ordered by her PCP on August 18 that was unremarkable except for arthritis.  She denies any known trauma.  She denies any fever, chest pain, shortness of air, abdominal pain, nausea vomiting diarrhea, urinary changes, neurologic changes, swelling, redness.          Review of Systems   Constitutional: Negative.    HENT: Negative.    Eyes: Negative.    Respiratory: Negative.    Cardiovascular: Negative.    Gastrointestinal: Negative.    Endocrine: Negative.    Genitourinary: Negative.    Musculoskeletal: Positive for arthralgias and myalgias.   Skin: Negative.    Allergic/Immunologic: Negative.    Neurological: Negative.    Hematological: Negative.    Psychiatric/Behavioral: Negative.    All other systems reviewed and are negative.      Past Medical History:   Diagnosis Date   • Allergic rhinitis    • Arthritis    • Chronic back pain    • Depression    • GERD (gastroesophageal reflux disease)    • Hypertension     mild    • Hypothyroidism    • Insomnia    • Melanoma (CMS/HCC)    • Muscle spasms of both lower extremities     and back    • PONV (postoperative nausea and vomiting)    • Thyroid cancer (CMS/HCC)        No Known Allergies    Past Surgical History:   Procedure Laterality Date   • ANTERIOR AND POSTERIOR VAGINAL REPAIR N/A 9/17/2018    Procedure: ANTERIOR AND POSTERIOR VAGINAL REPAIR;  Surgeon: Margaux Pearson MD;  Location: [facility];  Service: Obstetrics/Gynecology   • APPENDECTOMY     • BREAST RECONSTRUCTION     • BREAST SURGERY      Ede mastectomy   • CARDIAC CATHETERIZATION     • CHOLECYSTECTOMY     • COLONOSCOPY     • ESOPHAGEAL  DILATATION     • HYSTERECTOMY     • MID-URETHRAL SLING WITH CYSTOSCOPY N/A 9/17/2018    Procedure: MID-URETHRAL SLING WITH CYSTOSCOPY;  Surgeon: Margaux Pearson MD;  Location: [facility] OR;  Service: Obstetrics/Gynecology   • SKIN CANCER EXCISION     • TOTAL THYROIDECTOMY         Family History   Problem Relation Age of Onset   • Cancer Mother         breast   • Alzheimer's disease Father    • Heart attack Neg Hx    • Heart disease Neg Hx    • Heart failure Neg Hx        Social History     Socioeconomic History   • Marital status:      Spouse name: Not on file   • Number of children: Not on file   • Years of education: Not on file   • Highest education level: Not on file   Tobacco Use   • Smoking status: Former Smoker     Years: 30.00     Types: Cigarettes   • Smokeless tobacco: Never Used   Substance and Sexual Activity   • Alcohol use: Yes     Comment: socially   • Drug use: No   • Sexual activity: Defer           Objective   Physical Exam   Constitutional: She is oriented to person, place, and time. She appears well-developed and well-nourished.   HENT:   Head: Normocephalic and atraumatic.   Eyes: Pupils are equal, round, and reactive to light. Conjunctivae are normal.   Neck: Normal range of motion.   Cardiovascular: Normal rate, regular rhythm and normal heart sounds.   Pulmonary/Chest: Effort normal and breath sounds normal.   Abdominal: Soft. There is no tenderness.   Musculoskeletal: Normal range of motion. She exhibits no edema or deformity.   Tender to palpation over the left superior posterior hip and SI joint, nontender palpation elsewhere including CTL spines, pain with ranging of the left hip, normal range of motion, neurovascularly intact   Neurological: She is alert and oriented to person, place, and time. She has normal strength. No cranial nerve deficit or sensory deficit.   Skin: Skin is warm.   Psychiatric: She has a normal mood and affect. Her behavior is normal.   Nursing note and vitals  reviewed.      Procedures           ED Course      Patient was given decadron and morphine.  Symptoms resolved with treatment.    CT Lumbar Spine Without Contrast   Final Result   1. No acute fracture. Degenerative changes as above.    This report was finalized on 08/20/2020 08:20 by Dr Meron Molina MD.      CT Lower Extremity Left Without Contrast   Final Result   No acute findings.            This report was finalized on 08/20/2020 08:23 by Dr Meron Molina MD.        CT scan of the L-spine and left lower extremity show degenerative changes only.  No fractures or dislocations were appreciated.  Work-up consistent with sciatica.  Patient was feeling better and able to ambulate without difficulty.  I spoke to the patient's PCP.  He states he has referred the patient to orthopedic surgery and they will call the patient with an appointment.  He also prescribed Flexeril, Lortab and a Medrol Dosepak.  Patient was advised to continue those medications as prescribed.  She is to return immediately for any worsening or new pain, fever or other concerns.  Patient agreeable.                                     MDM    Final diagnoses:   Sciatica of left side            Meron Norman MD  08/20/20 0957

## 2021-03-15 ENCOUNTER — IMMUNIZATION (OUTPATIENT)
Dept: VACCINE CLINIC | Facility: HOSPITAL | Age: 76
End: 2021-03-15

## 2021-03-15 PROCEDURE — 91301 HC SARSCO02 VAC 100MCG/0.5ML IM: CPT | Performed by: OBSTETRICS & GYNECOLOGY

## 2021-03-15 PROCEDURE — 0011A: CPT | Performed by: OBSTETRICS & GYNECOLOGY

## 2021-04-12 ENCOUNTER — IMMUNIZATION (OUTPATIENT)
Dept: VACCINE CLINIC | Facility: HOSPITAL | Age: 76
End: 2021-04-12

## 2021-04-12 PROCEDURE — 0012A: CPT | Performed by: OBSTETRICS & GYNECOLOGY

## 2021-04-12 PROCEDURE — 91301 HC SARSCO02 VAC 100MCG/0.5ML IM: CPT | Performed by: OBSTETRICS & GYNECOLOGY

## 2021-07-02 NOTE — PROGRESS NOTES
[facility] - PODIATRY    Today's Date: 07/07/21    Patient Name: Mary Ann Jones  MRN: [id number]  [facility]: [id number]  PCP: Marcos Hernandez APRN  Referring Provider: Nolan Pierce DPM    SUBJECTIVE     Chief Complaint   Patient presents with   • Establish Care     pcp06/25/2021 ingrown toe nails- pt states had surgery on bilateral ingrown toe nails about 5 years ago. growing back and hurting - pt denies pain- pt presents with no visable abnormalites at present     HPI: Mary Ann Jones, a 75 y.o.female, comes to clinic as a(n) new patient complaining of painful toenails. Patient has h/o Vitas, chronic back pain, depression, GERD, hypertension, hypothyroidism. Patient presents with complaints of bilateral hallux nail issues along bilateral borders.  Notes that approximately 5 years ago she had procedure done on toenails to have nail borders removed permanently, however, they ultimately ended up growing back.  Denies any pain at the present time, however, states that they become sharp and painful at times.  Patient would like to have additional procedure performed to have nails permanently removed.  Denies redness, drainage, or other signs of infection. Denies pain today, however, reports that nails become tender and ingrown at times in the borders bilaterally on both hallux nails. Relates previous treatment(s) including Partial nail avulsion with matrixectomy (failed). Denies any constitutional symptoms. No other pedal complaints at this time.    Past Medical History:   Diagnosis Date   • Allergic rhinitis    • Arthritis    • Chronic back pain    • Depression    • GERD (gastroesophageal reflux disease)    • Hypertension     mild    • Hypothyroidism    • Insomnia    • Melanoma (CMS/HCC)    • Muscle spasms of both lower extremities     and back    • PONV (postoperative nausea and vomiting)    • Thyroid cancer (CMS/HCC)      Past Surgical History:   Procedure Laterality Date   • ANTERIOR AND  POSTERIOR VAGINAL REPAIR N/A 9/17/2018    Procedure: ANTERIOR AND POSTERIOR VAGINAL REPAIR;  Surgeon: Margaux Pearson MD;  Location:  [facility] OR;  Service: Obstetrics/Gynecology   • APPENDECTOMY     • BREAST RECONSTRUCTION     • BREAST SURGERY      Ede mastectomy   • CARDIAC CATHETERIZATION     • CHOLECYSTECTOMY     • COLONOSCOPY     • ESOPHAGEAL DILATATION     • HYSTERECTOMY     • MID-URETHRAL SLING WITH CYSTOSCOPY N/A 9/17/2018    Procedure: MID-URETHRAL SLING WITH CYSTOSCOPY;  Surgeon: Margaux Pearson MD;  Location:  [facility] OR;  Service: Obstetrics/Gynecology   • SKIN CANCER EXCISION     • TOTAL THYROIDECTOMY       Family History   Problem Relation Age of Onset   • Cancer Mother         breast   • Alzheimer's disease Father    • Heart attack Neg Hx    • Heart disease Neg Hx    • Heart failure Neg Hx      Social History     Socioeconomic History   • Marital status:      Spouse name: Not on file   • Number of children: Not on file   • Years of education: Not on file   • Highest education level: Not on file   Tobacco Use   • Smoking status: Former Smoker     Years: 30.00     Types: Cigarettes   • Smokeless tobacco: Never Used   Substance and Sexual Activity   • Alcohol use: Yes     Comment: socially   • Drug use: No   • Sexual activity: Defer     No Known Allergies  Current Outpatient Medications   Medication Sig Dispense Refill   • diclofenac-miSOPROStol (ARTHROTEC 75) 75-0.2 MG EC tablet Take 1 tablet by mouth 2 (Two) Times a Day.     • estradiol (ESTRACE) 2 MG tablet Take 2 mg by mouth Daily.     • hydrochlorothiazide (HYDRODIURIL) 12.5 MG tablet 12.5 mg Daily.     • HYDROcodone-acetaminophen (NORCO) 5-325 MG per tablet Take 1 tablet by mouth Every 6 (Six) Hours As Needed for Mild Pain  or Moderate Pain  (TAKES HALF A PILL).     • levothyroxine (SYNTHROID, LEVOTHROID) 125 MCG tablet Take 1 tablet by mouth Daily. 90 tablet 3     No current facility-administered medications for this visit.     Review of Systems    Constitutional: Negative for chills and fever.   HENT: Negative for congestion.    Respiratory: Negative for shortness of breath.    Cardiovascular: Negative for chest pain and leg swelling.   Gastrointestinal: Negative for constipation, diarrhea, nausea and vomiting.   Musculoskeletal: Positive for arthralgias. Negative for myalgias.   Skin: Negative for wound.        IGTN CHOLO HALLUX   Neurological: Negative for numbness.       OBJECTIVE     Vitals:    07/07/21 1330   BP: 128/62   Pulse: 87   SpO2: 97%       PHYSICAL EXAM  GEN:   Accompanied by none.     Foot/Ankle Exam:       General:   Appearance: appears stated age and healthy    Orientation: AAOx3    Affect: appropriate    Gait: unimpaired    Assistance: independent    Shoe Gear:  Sandals    VASCULAR      Right Foot Vascularity   Dorsalis pedis:  2+  Posterior tibial:  2+  Skin Temperature: warm    Edema Grading:  None  CFT:  3  Pedal Hair Growth:  Present  Varicosities: mild varicosities       Left Foot Vascularity   Dorsalis pedis:  2+  Posterior tibial:  2+  Skin Temperature: warm    Edema Grading:  None  CFT:  3  Pedal Hair Growth:  Present  Varicosities: mild varicosities        NEUROLOGIC     Right Foot Neurologic   Normal sensation    Light touch sensation:  Normal  Vibratory sensation:  Normal  Hot/Cold sensation: normal    Protective Sensation using Verner-Yaneth Monofilament:  10     Left Foot Neurologic   Normal sensation    Light touch sensation:  Normal  Vibratory sensation:  Normal  Hot/cold sensation: normal    Protective Sensation using Verner-Yaneth Monofilament:  10     MUSCULOSKELETAL      Right Foot Musculoskeletal   Ecchymosis:  None  Tenderness: toenails and toe 1    Arch:  Normal  Hallux valgus: No       Left Foot Musculoskeletal   Ecchymosis:  None  Tenderness: toenails and toe 1    Arch:  Normal  Hallux valgus: No       MUSCLE STRENGTH     Right Foot Muscle Strength   Foot dorsiflexion:  5  Foot plantar flexion:  5  Foot  inversion:  5  Foot eversion:  5     Left Foot Muscle Strength   Foot dorsiflexion:  5  Foot plantar flexion:  5  Foot inversion:  5  Foot eversion:  5     RANGE OF MOTION      Right Foot Range of Motion   Foot and ankle ROM within normal limits       Left Foot Range of Motion   Foot and ankle ROM within normal limits       DERMATOLOGIC     Right Foot Dermatologic   Skin: skin intact    Nails: dystrophic nails and ingrown toenail (Hallux, bilateral border)       Left Foot Dermatologic   Skin: skin intact    Nails: dystrophic nails and ingrown toenail (Hallux, bilateral border)        RADIOLOGY/NUCLEAR:  No results found.    LABORATORY/CULTURE RESULTS:      PATHOLOGY RESULTS:       ASSESSMENT/PLAN     Diagnoses and all orders for this visit:    1. Ingrown toenail of both feet (Primary)  -     bupivacaine (MARCAINE) 0.5 % injection 5 mL  -     bupivacaine (MARCAINE) 0.5 % injection 5 mL    2. Foot pain, bilateral    Other orders  -     Nail Removal  -     Nail Removal      Comprehensive lower extremity examination and evaluation was performed.  Discussed findings and treatment plan including risks, benefits, and treatment options with patient in detail. Patient agreed with treatment plan.  Destini treatment options with patient including slant back procedure versus partial nail avulsion with matrixectomy again.  At this time patient wishes to proceed with nail avulsion with matrixectomy to remove offending nail borders in hopes of preventing recurrent issues in the future.  After written consent obtained, total/partial nail avulsion(s) performed as documented in procedure note. Post-procedure instructions given.    An After Visit Summary was printed and given to the patient at discharge, including (if requested) any available informative/educational handouts regarding diagnosis, treatment, or medications. All questions were answered to patient/family satisfaction. Should symptoms fail to improve or worsen they agree to  call or return to clinic or to go to the Emergency Department. Discussed the importance of following up with any needed screening tests/labs/specialist appointments and any requested follow-up recommended by me today. Importance of maintaining follow-up discussed and patient accepts that missed appointments can delay diagnosis and potentially lead to worsening of conditions.  Return in about 2 weeks (around 7/21/2021)., or sooner if acute issues arise.    Nail Removal    Date/Time: 7/7/2021 2:10 PM  Performed by: Duke Armenta APRN  Authorized by: Duke Armenta APRN   Location: right foot  Location details: right big toe  Anesthesia: digital block    Anesthesia:  Local Anesthetic: bupivacaine 0.5% without epinephrine  Anesthetic total: 5 mL    Sedation:  Patient sedated: no    Preparation: skin prepped with Betadine  Amount removed: partial  Side: lateral and medial  Wedge excision of skin of nail fold: no  Nail bed sutured: no  Nail matrix removed: partial (phenol)  Removed nail replaced and anchored: no  Dressing: 4x4, antibiotic ointment and gauze roll  Patient tolerance: patient tolerated the procedure well with no immediate complications  Comments: Flushed with alcohol.  Silvadene applied    Nail Removal    Date/Time: 7/7/2021 2:11 PM  Performed by: Duke Armenta APRN  Authorized by: Duke Armenta APRN   Location: left foot  Location details: left big toe  Anesthesia: digital block    Anesthesia:  Local Anesthetic: bupivacaine 0.5% without epinephrine  Anesthetic total: 5 mL    Sedation:  Patient sedated: no    Preparation: skin prepped with Betadine  Amount removed: partial  Side: lateral and medial  Wedge excision of skin of nail fold: no  Nail bed sutured: no  Nail matrix removed: partial (phenol)  Removed nail replaced and anchored: no  Dressing: 4x4, antibiotic ointment and gauze roll  Patient tolerance: patient tolerated the procedure well with no immediate  complications  Comments: Flushed with alcohol.  Silvadene applied.          Lab Frequency Next Occurrence   Follow Anesthesia Guidelines / Standing Orders Once 09/05/2018       This document has been electronically signed by LEYLA Culp on July 7, 2021 15:14 CDT

## 2021-07-06 ENCOUNTER — TELEPHONE (OUTPATIENT)
Dept: PODIATRY | Facility: CLINIC | Age: 76
End: 2021-07-06

## 2021-07-06 NOTE — TELEPHONE ENCOUNTER
Called patient regarding appt on 07/07/2021. Left message for patient to return call if any questions or concerns arise.

## 2021-07-07 ENCOUNTER — OFFICE VISIT (OUTPATIENT)
Dept: PODIATRY | Facility: CLINIC | Age: 76
End: 2021-07-07

## 2021-07-07 VITALS
WEIGHT: 118.4 LBS | OXYGEN SATURATION: 97 % | SYSTOLIC BLOOD PRESSURE: 128 MMHG | HEIGHT: 64 IN | HEART RATE: 87 BPM | DIASTOLIC BLOOD PRESSURE: 62 MMHG | BODY MASS INDEX: 20.22 KG/M2

## 2021-07-07 DIAGNOSIS — M79.672 FOOT PAIN, BILATERAL: ICD-10-CM

## 2021-07-07 DIAGNOSIS — L60.0 INGROWN TOENAIL OF BOTH FEET: Primary | ICD-10-CM

## 2021-07-07 DIAGNOSIS — M79.671 FOOT PAIN, BILATERAL: ICD-10-CM

## 2021-07-07 PROCEDURE — 11750 EXCISION NAIL&NAIL MATRIX: CPT | Performed by: NURSE PRACTITIONER

## 2021-07-07 PROCEDURE — 99213 OFFICE O/P EST LOW 20 MIN: CPT | Performed by: NURSE PRACTITIONER

## 2021-07-07 RX ORDER — BUPIVACAINE HYDROCHLORIDE 5 MG/ML
5 INJECTION, SOLUTION PERINEURAL ONCE
Status: COMPLETED | OUTPATIENT
Start: 2021-07-07 | End: 2021-07-07

## 2021-07-07 RX ORDER — DICLOFENAC SODIUM AND MISOPROSTOL 75; 200 MG/1; UG/1
1 TABLET, DELAYED RELEASE ORAL 2 TIMES DAILY
COMMUNITY
End: 2022-02-09

## 2021-07-07 RX ADMIN — BUPIVACAINE HYDROCHLORIDE 5 ML: 5 INJECTION, SOLUTION PERINEURAL at 14:25

## 2021-07-07 RX ADMIN — BUPIVACAINE HYDROCHLORIDE 5 ML: 5 INJECTION, SOLUTION PERINEURAL at 14:26

## 2021-07-07 NOTE — PATIENT INSTRUCTIONS
Nail Avulsion  Nail avulsion is when a nail tears away from the nail bed due to an accident or injury. Nail avulsion can be painful. Your finger or toe may bleed a lot, and you may have some pain, redness, throbbing, and swelling while it heals. Your nail will grow back within several months. Once it grows back, it might not look the same as the old nail. This may happen even after taking good care of it.  Follow these instructions at home:  Wound care  · Follow instructions from your health care provider about how to take care of your wound. Make sure you:  ? Wash your hands with soap and water before and after you change your bandage (dressing). If soap and water are not available, use hand .  ? Change your dressing as told by your health care provider.  ? Leave stitches (sutures), skin glue, or adhesive strips in place, if present. These skin closures may need to stay in place for 2 weeks or longer. If adhesive strip edges start to loosen and curl up, you may trim the loose edges. Do not remove adhesive strips completely unless your health care provider tells you to do that.  · Check your wound every day for signs of infection. Check for:  ? Redness, swelling, or pain.  ? Fluid or blood.  ? Warmth.  ? Pus or a bad smell.  · Do not take baths, swim, or use a hot tub until your health care provider approves. Ask your health care provider if you may take showers. You may only be allowed to take sponge baths.  · If you notice bleeding, press gently on the nail bed with a gauze pad. Do this for 15 minutes.  · Keep the wound dry for 48 hours. After 48 hours have passed, lightly wash the finger or toe in warm, soapy water 2-3 times a day. This helps to reduce pain and swelling. It also prevents infection.  Medicine  · Take over-the-counter and prescription medicines only as told by your health care provider. Do not take aspirin or products containing aspirin unless directed by your health care provider. These  products can increase bleeding.  · If you were prescribed an antibiotic medicine, take it or apply it as told by your health care provider. Do not stop taking or using the antibiotic even if you start to feel better.  General instructions    · Keep the injured hand or foot raised above the level of your heart as much as possible in the first 48 hours after the injury. This helps to reduce pain and swelling.  · Move the toe or finger often to avoid stiffness.  · Do not use any products that contain nicotine or tobacco, such as cigarettes, e-cigarettes, and chewing tobacco. These can delay healing. If you need help quitting, ask your health care provider.  Contact a health care provider if:  · You have increased redness, swelling, or pain around your wound.  · You have fluid or blood coming from your wound.  · You have pus or a bad smell coming from your wound.  · Your wound or the area around your wound feels warm to the touch.  Get help right away if:  · You have bleeding that does not stop, even when you apply pressure to the wound.  · You have a temperature that is higher than 100.4°F (38°C).  · The affected finger or toe looks white or black.  Summary  · Nail avulsion is when a nail tears away from the nail bed due to an accident or injury.  · Follow instructions from your health care provider about how to take care of your wound.  · Keep the injured hand or foot raised above the level of your heart as much as possible in the first 48 hours after the injury. This helps to reduce pain and swelling.  · Check your wound every day for signs of infection.  · Contact a health care provider if you have increased redness, swelling, or pain around your wound.  This information is not intended to replace advice given to you by your health care provider. Make sure you discuss any questions you have with your health care provider.  Document Revised: 08/14/2019 Document Reviewed: 08/14/2019  Elsevier Patient Education © 2021  [employer]

## 2021-07-12 ENCOUNTER — TELEPHONE (OUTPATIENT)
Dept: PODIATRY | Facility: CLINIC | Age: 76
End: 2021-07-12

## 2021-07-12 NOTE — TELEPHONE ENCOUNTER
Pt wondering if she can walk behind a  for about 2hrs. Wondering if it would injure toes, had nail avulsions done last Wednesday. Toes are fine, however left great toe is somewhat swollen and red. Pt states both great toes are improving and using the ointment/epsom salt soaks. I informed pt that I would speak to Jono MAYER and call her back.

## 2021-07-12 NOTE — TELEPHONE ENCOUNTER
Spoke with patient regarding phone call from earlier, informed pt that she could mow her lawn. Just keep her toes clean and dry. Pt expressed understanding

## 2021-07-12 NOTE — PROGRESS NOTES
[facility] - PODIATRY    Today's Date: 07/21/21    Patient Name: Mary Ann Jones  MRN: [id number]  CSN: [id number]  PCP: Marcos Hernandez APRN  Referring Provider: No ref. provider found    SUBJECTIVE     Chief Complaint   Patient presents with   • Follow-up     pcp06/25/2021 2 WK FU- pt states feet not doing well, left great toe feels like a nail is sticking in it all the time, left one bled for 2 to 3 days after last visit- pt denies pain at present- pt present with discolored great nails both feet, some redness around the nail areas     HPI: Mary Ann Jones, a 75 y.o.female, comes to clinic as a(n) established patient for follow-up of bilateral hallux nails. Patient has h/o Vitas, chronic back pain, depression, GERD, hypertension, hypothyroidism. Now 2 weeks post partial nail avulsion of bilateral hallux. States that she has been doing okay. Has soaked feet but states that it dries her out. Has applied band-aid and triple antibiotic ointment. Denies pain. Relates previous treatment(s) including Partial nail avulsion with matrixectomy (failed). Denies any constitutional symptoms. No other pedal complaints at this time.    Past Medical History:   Diagnosis Date   • Allergic rhinitis    • Arthritis    • Chronic back pain    • Depression    • GERD (gastroesophageal reflux disease)    • Hypertension     mild    • Hypothyroidism    • Insomnia    • Melanoma (CMS/HCC)    • Muscle spasms of both lower extremities     and back    • PONV (postoperative nausea and vomiting)    • Thyroid cancer (CMS/HCC)      Past Surgical History:   Procedure Laterality Date   • ANTERIOR AND POSTERIOR VAGINAL REPAIR N/A 9/17/2018    Procedure: ANTERIOR AND POSTERIOR VAGINAL REPAIR;  Surgeon: Margaux Pearson MD;  Location: [facility];  Service: Obstetrics/Gynecology   • APPENDECTOMY     • BREAST RECONSTRUCTION     • BREAST SURGERY      Ede mastectomy   • CARDIAC CATHETERIZATION     • CHOLECYSTECTOMY     • COLONOSCOPY      • ESOPHAGEAL DILATATION     • HYSTERECTOMY     • MID-URETHRAL SLING WITH CYSTOSCOPY N/A 9/17/2018    Procedure: MID-URETHRAL SLING WITH CYSTOSCOPY;  Surgeon: Margaux Pearson MD;  Location: [facility] OR;  Service: Obstetrics/Gynecology   • SKIN CANCER EXCISION     • TOTAL THYROIDECTOMY       Family History   Problem Relation Age of Onset   • Cancer Mother         breast   • Alzheimer's disease Father    • Heart attack Neg Hx    • Heart disease Neg Hx    • Heart failure Neg Hx      Social History     Socioeconomic History   • Marital status:      Spouse name: Not on file   • Number of children: Not on file   • Years of education: Not on file   • Highest education level: Not on file   Tobacco Use   • Smoking status: Former Smoker     Years: 30.00     Types: Cigarettes   • Smokeless tobacco: Never Used   Vaping Use   • Vaping Use: Never used   Substance and Sexual Activity   • Alcohol use: Yes     Comment: socially   • Drug use: No   • Sexual activity: Defer     No Known Allergies  Current Outpatient Medications   Medication Sig Dispense Refill   • diclofenac-miSOPROStol (ARTHROTEC 75) 75-0.2 MG EC tablet Take 1 tablet by mouth 2 (Two) Times a Day.     • estradiol (ESTRACE) 2 MG tablet Take 2 mg by mouth Daily.     • hydrochlorothiazide (HYDRODIURIL) 12.5 MG tablet 12.5 mg Daily.     • HYDROcodone-acetaminophen (NORCO) 5-325 MG per tablet Take 1 tablet by mouth Every 6 (Six) Hours As Needed for Mild Pain  or Moderate Pain  (TAKES HALF A PILL).     • levothyroxine (SYNTHROID, LEVOTHROID) 125 MCG tablet Take 1 tablet by mouth Daily. 90 tablet 3     No current facility-administered medications for this visit.     Review of Systems   Constitutional: Negative for chills and fever.   HENT: Negative for congestion.    Respiratory: Negative for shortness of breath.    Cardiovascular: Negative for chest pain and leg swelling.   Gastrointestinal: Negative for constipation, diarrhea, nausea and vomiting.   Musculoskeletal:  Negative for arthralgias and myalgias.   Skin: Negative for wound.   Neurological: Negative for numbness.       OBJECTIVE     Vitals:    07/21/21 0859   BP: 142/62   Pulse: 84   SpO2: 99%       PHYSICAL EXAM  GEN:   Accompanied by none.     Foot/Ankle Exam:       General:   Appearance: appears stated age and healthy    Orientation: AAOx3    Affect: appropriate    Gait: unimpaired    Assistance: independent    Shoe Gear:  Sandals    VASCULAR      Right Foot Vascularity   Dorsalis pedis:  2+  Posterior tibial:  2+  Skin Temperature: warm    Edema Grading:  None  CFT:  3  Pedal Hair Growth:  Present  Varicosities: mild varicosities       Left Foot Vascularity   Dorsalis pedis:  2+  Posterior tibial:  2+  Skin Temperature: warm    Edema Grading:  None  CFT:  3  Pedal Hair Growth:  Present  Varicosities: mild varicosities        NEUROLOGIC     Right Foot Neurologic   Normal sensation    Light touch sensation:  Normal  Vibratory sensation:  Normal  Hot/Cold sensation: normal    Protective Sensation using Dickens-Yaneth Monofilament:  10     Left Foot Neurologic   Normal sensation    Light touch sensation:  Normal  Vibratory sensation:  Normal  Hot/cold sensation: normal    Protective Sensation using Dickens-Yaneth Monofilament:  10     MUSCULOSKELETAL      Right Foot Musculoskeletal   Ecchymosis:  None  Tenderness: toenails and toe 1    Arch:  Normal  Hallux valgus: No       Left Foot Musculoskeletal   Ecchymosis:  None  Tenderness: toenails and toe 1    Arch:  Normal  Hallux valgus: No       MUSCLE STRENGTH     Right Foot Muscle Strength   Foot dorsiflexion:  5  Foot plantar flexion:  5  Foot inversion:  5  Foot eversion:  5     Left Foot Muscle Strength   Foot dorsiflexion:  5  Foot plantar flexion:  5  Foot inversion:  5  Foot eversion:  5     RANGE OF MOTION      Right Foot Range of Motion   Foot and ankle ROM within normal limits       Left Foot Range of Motion   Foot and ankle ROM within normal limits        DERMATOLOGIC     Right Foot Dermatologic   Skin: skin intact    Nails: dystrophic nails    Nails: no ingrown toenail       Left Foot Dermatologic   Skin: skin intact    Nails: dystrophic nails    Nails: no ingrown toenail        RADIOLOGY/NUCLEAR:  No results found.    LABORATORY/CULTURE RESULTS:      PATHOLOGY RESULTS:       ASSESSMENT/PLAN     Diagnoses and all orders for this visit:    1. Avulsion of toenail, subsequent encounter (Primary)      Comprehensive lower extremity examination and evaluation was performed.  Discussed findings and treatment plan including risks, benefits, and treatment options with patient in detail. Patient agreed with treatment plan.  Avulsions healing well. Bordes lightly debrided to remove dried blood and tissues   May return to normal activities as tolerated. No restrictions.   An After Visit Summary was printed and given to the patient at discharge, including (if requested) any available informative/educational handouts regarding diagnosis, treatment, or medications. All questions were answered to patient/family satisfaction. Should symptoms fail to improve or worsen they agree to call or return to clinic or to go to the Emergency Department. Discussed the importance of following up with any needed screening tests/labs/specialist appointments and any requested follow-up recommended by me today. Importance of maintaining follow-up discussed and patient accepts that missed appointments can delay diagnosis and potentially lead to worsening of conditions.  Return if symptoms worsen or fail to improve., or sooner if acute issues arise.    Procedures    Lab Frequency Next Occurrence   Follow Anesthesia Guidelines / Standing Orders Once 09/05/2018       This document has been electronically signed by LEYLA Culp on July 21, 2021 11:39 CDT

## 2021-07-16 ENCOUNTER — TELEPHONE (OUTPATIENT)
Dept: PODIATRY | Facility: CLINIC | Age: 76
End: 2021-07-16

## 2021-07-16 NOTE — TELEPHONE ENCOUNTER
Called pt back regarding her phone call. Informed her that no one in our office told her to stop taking her cyclobenzaprine or diazepam. Pt will continue taking these. Informed patient that she should be taking ibuprofen for the pain and inflammation. Pt states that she only has pain when she is touching it. Informed pt that she should only be touching it when she is cleaning the area or applying antibiotic ointment/bandage. Pt also informed to keep bandaid over the wound. I informed the pt to keep up with her epsom salt soaks and keep the area clean, dry, and covered with bandage/triple antibiotic. Pt expressed understanding

## 2021-07-16 NOTE — TELEPHONE ENCOUNTER
Pt called and stated that she has an infection in the left great toe post nail avulsion. Pt states that she had some oozing and bleeding after the nail avulsion last week, and has not had any discharge since then. Pt states that she took a penicillin pill  This morning because she assumes there is an infection. Pt was unable to come in today as she did not have transportation. Left toe is painful, and somewhat swollen on left side of left great toe. Pt not keeping it covered. Pt does admit to doing the epsom salt soaks and using triple antibiotic. Pt states the office told her to stay off her cyclobenzaprine, and diazepam. Pt has not been taking them.

## 2021-07-20 ENCOUNTER — TELEPHONE (OUTPATIENT)
Dept: PODIATRY | Facility: CLINIC | Age: 76
End: 2021-07-20

## 2021-07-21 ENCOUNTER — OFFICE VISIT (OUTPATIENT)
Dept: PODIATRY | Facility: CLINIC | Age: 76
End: 2021-07-21

## 2021-07-21 VITALS
SYSTOLIC BLOOD PRESSURE: 142 MMHG | DIASTOLIC BLOOD PRESSURE: 62 MMHG | HEIGHT: 64 IN | HEART RATE: 84 BPM | OXYGEN SATURATION: 99 % | WEIGHT: 115 LBS | BODY MASS INDEX: 19.63 KG/M2

## 2021-07-21 DIAGNOSIS — S91.209D AVULSION OF TOENAIL, SUBSEQUENT ENCOUNTER: Primary | ICD-10-CM

## 2021-07-21 PROCEDURE — 99212 OFFICE O/P EST SF 10 MIN: CPT | Performed by: NURSE PRACTITIONER

## 2022-02-07 NOTE — PROGRESS NOTES
[facility] - PODIATRY    Today's Date: 02/09/22    Patient Name: Mary Ann Jones  MRN: [id number]  CSN: [id number]  PCP: Marcos Hernandez APRN  Referring Provider: No ref. provider found    SUBJECTIVE     Chief Complaint   Patient presents with   • Follow-up     pcp12/29/2021 NEW PATIENT INCOMING REF- RIGHT FOOT PAIN- pt states 3rd and 4th toes dont seem to work and hurt a lot, has picked up a fungus on great nails, wakes up sometimes at night with bottom of her feet buring/hurting- pt elida 6/10 at its worst- pt presents with discolored thick nails, no other visible issues     HPI: Mary Ann Jones, a 76 y.o.female, comes to clinic as a(n) established patient complaining of foot pain and complaining of Possible fungal nail growth of bilateral hallux nails. Patient has h/o Vitas, chronic back pain, depression, GERD, hypertension, hypothyroidism. Patient presents with complaints of irregular toenail growth of her bilateral hallux nails.  Patient was previously seen and had partial nail avulsions completed due to ingrowing nails.  She states that now a portion of the right hallux nail has fallen off and that the left hallux nail is discolored, has a horizontal ridge, and is flaky.  She started taking oral acyclovir as she thought that this would help with the nail.  She has not previously had any other treatment for fungal nail infection.  She does note a history of thyroid disease which could be contributing to nail changes.  Denies issues with nails 2 through 5 of either foot.  She also states that she has been having quite a bit of pain in the ball of the right foot.  States that the third and fourth toes have been the most painful but states that pain is now spreading over towards the fifth laterally and second medially.  States the pain is present when walking.  Feels like third and fourth toes do not work as well as they used to. Admits pain at 6/10 level and described as shooting, aching  and sharp. Denies previous treatment. Denies any constitutional symptoms. No other pedal complaints at this time.    Past Medical History:   Diagnosis Date   • Allergic rhinitis    • Arthritis    • Chronic back pain    • Depression    • GERD (gastroesophageal reflux disease)    • Hypertension     mild    • Hypothyroidism    • Insomnia    • Melanoma (HCC)    • Muscle spasms of both lower extremities     and back    • PONV (postoperative nausea and vomiting)    • Thyroid cancer (HCC)      Past Surgical History:   Procedure Laterality Date   • ANTERIOR AND POSTERIOR VAGINAL REPAIR N/A 9/17/2018    Procedure: ANTERIOR AND POSTERIOR VAGINAL REPAIR;  Surgeon: Margaux Pearson MD;  Location: [facility] OR;  Service: Obstetrics/Gynecology   • APPENDECTOMY     • BREAST RECONSTRUCTION     • BREAST SURGERY      Ede mastectomy   • CARDIAC CATHETERIZATION     • CHOLECYSTECTOMY     • COLONOSCOPY     • ESOPHAGEAL DILATATION     • HYSTERECTOMY     • MID-URETHRAL SLING WITH CYSTOSCOPY N/A 9/17/2018    Procedure: MID-URETHRAL SLING WITH CYSTOSCOPY;  Surgeon: Margaux Pearson MD;  Location: [facility] OR;  Service: Obstetrics/Gynecology   • SKIN CANCER EXCISION     • TOTAL THYROIDECTOMY       Family History   Problem Relation Age of Onset   • Cancer Mother         breast   • Alzheimer's disease Father    • Heart attack Neg Hx    • Heart disease Neg Hx    • Heart failure Neg Hx      Social History     Socioeconomic History   • Marital status:    Tobacco Use   • Smoking status: Former Smoker     Years: 30.00     Types: Cigarettes   • Smokeless tobacco: Never Used   Vaping Use   • Vaping Use: Never used   Substance and Sexual Activity   • Alcohol use: Yes     Comment: socially   • Drug use: No   • Sexual activity: Defer     No Known Allergies  Current Outpatient Medications   Medication Sig Dispense Refill   • acyclovir (ZOVIRAX) 400 MG tablet Take 400 mg by mouth Every 4 (Four) Hours While Awake. Take no more than 5 doses a day.     •  estradiol (ESTRACE) 2 MG tablet Take 2 mg by mouth Daily.     • hydrochlorothiazide (HYDRODIURIL) 12.5 MG tablet 12.5 mg Daily.     • levothyroxine (SYNTHROID, LEVOTHROID) 125 MCG tablet Take 1 tablet by mouth Daily. 90 tablet 3   • tiZANidine (ZANAFLEX) 4 MG tablet Take 4 mg by mouth At Night As Needed for Muscle Spasms.     • ciclopirox (PENLAC) 8 % solution Apply  topically to the appropriate area as directed Every Night for 336 days. Apply as directed to affected toenails. 6 mL 5     No current facility-administered medications for this visit.     Review of Systems   Constitutional: Negative for chills and fever.   HENT: Negative for congestion.    Respiratory: Negative for shortness of breath.    Cardiovascular: Negative for chest pain and leg swelling.   Gastrointestinal: Negative for constipation, diarrhea, nausea and vomiting.   Musculoskeletal: Positive for arthralgias. Negative for myalgias.   Skin: Negative for wound.   Neurological: Negative for numbness.       OBJECTIVE     Vitals:    02/09/22 0806   BP: 122/60   Pulse: 86   SpO2: 97%       PHYSICAL EXAM  GEN:   Accompanied by none.     Foot/Ankle Exam:       General:   Appearance: appears stated age and healthy    Orientation: AAOx3    Affect: appropriate    Gait: unimpaired    Assistance: independent    Shoe Gear:  Casual shoes    VASCULAR      Right Foot Vascularity   Dorsalis pedis:  2+  Posterior tibial:  2+  Skin Temperature: warm    Edema Grading:  None  CFT:  3  Pedal Hair Growth:  Present  Varicosities: mild varicosities       Left Foot Vascularity   Dorsalis pedis:  2+  Posterior tibial:  2+  Skin Temperature: warm    Edema Grading:  None  CFT:  3  Pedal Hair Growth:  Present  Varicosities: mild varicosities        NEUROLOGIC     Right Foot Neurologic   Normal sensation    Light touch sensation:  Normal  Vibratory sensation:  Normal  Hot/Cold sensation: normal    Protective Sensation using [place] Monofilament:  10     Left Foot  Neurologic   Normal sensation    Light touch sensation:  Normal  Vibratory sensation:  Normal  Hot/cold sensation: normal    Protective Sensation using [place] Monofilament:  10     MUSCULOSKELETAL      Right Foot Musculoskeletal   Ecchymosis:  None  Tenderness: neuroma    Tenderness comment:  3rd interspace   Arch:  Normal  Hallux valgus: No       Left Foot Musculoskeletal   Ecchymosis:  None  Tenderness: none    Arch:  Normal  Hallux valgus: No       MUSCLE STRENGTH     Right Foot Muscle Strength   Foot dorsiflexion:  5  Foot plantar flexion:  5  Foot inversion:  5  Foot eversion:  5     Left Foot Muscle Strength   Foot dorsiflexion:  5  Foot plantar flexion:  5  Foot inversion:  5  Foot eversion:  5     RANGE OF MOTION      Right Foot Range of Motion   Foot and ankle ROM within normal limits       Left Foot Range of Motion   Foot and ankle ROM within normal limits       DERMATOLOGIC     Right Foot Dermatologic   Skin: skin intact and atrophic    Nails: onychomycosis and dystrophic nails    Nails: no ingrown toenail    Nails comment:  Hallux     Left Foot Dermatologic   Skin: skin intact and atrophic    Nails: onychomycosis and dystrophic nails    Nails: no ingrown toenail    Nails comment:  Hallux     Image:       RADIOLOGY/NUCLEAR:  No results found.    LABORATORY/CULTURE RESULTS:      PATHOLOGY RESULTS:       ASSESSMENT/PLAN     Diagnoses and all orders for this visit:    1. Plantar neuroma of right foot (Primary)  -     bupivacaine (MARCAINE) 0.5 % injection 1 mL  -     dexamethasone (DECADRON) injection 10 mg  -     triamcinolone acetonide (KENALOG-40) injection 20 mg  -     XR Foot 3+ View Right; Future  -     Nerve Block    2. Onychomycosis  -     ciclopirox (PENLAC) 8 % solution; Apply  topically to the appropriate area as directed Every Night for 336 days. Apply as directed to affected toenails.  Dispense: 6 mL; Refill: 5    3. Foot pain, right      Comprehensive lower extremity examination and  evaluation was performed.  Discussed findings and treatment plan including risks, benefits, and treatment options with patient in detail. Patient agreed with treatment plan.  Findings consistent with plantar neuroma of right 3rd interspace. Discussed treatment options including injection therapy, padding/offlading, and ultimately surgical excision if conservative measures fail. Agreeable to injection today.   After written consent obtained, plantar neuroma injection performed as documented in procedure notes.  Patient may maintain nails and calluses at home utilizing emery board or pumice stone between visits as needed   RX Ciclopirox solution for hallux nails. Discussed length of treatment and weekly removal of solution with nail polish or alcohol.   Recommend purchase of metatarsal pad.   X-rays of the right foot to assess for any other bony abnormalities.  An After Visit Summary was printed and given to the patient at discharge, including (if requested) any available informative/educational handouts regarding diagnosis, treatment, or medications. All questions were answered to patient/family satisfaction. Should symptoms fail to improve or worsen they agree to call or return to clinic or to go to the Emergency Department. Discussed the importance of following up with any needed screening tests/labs/specialist appointments and any requested follow-up recommended by me today. Importance of maintaining follow-up discussed and patient accepts that missed appointments can delay diagnosis and potentially lead to worsening of conditions.  Return in about 1 month (around 3/9/2022)., or sooner if acute issues arise.    Nerve Block    Date/Time: 2/9/2022 8:25 AM  Performed by: Duke Amrenta APRN  Authorized by: Duke Armenta APRN   Indications: pain relief  Body area: lower extremity  Nerve: plantar  Laterality: right    Sedation:  Patient sedated: no    Preparation: Patient was prepped and draped in the  usual sterile fashion.  Patient position: sitting  Needle size: 25 G  Location technique: anatomical landmarks  Local Anesthetic: bupivacaine 0.5% without epinephrine  Anesthetic total: 1 mL  Outcome: pain improved  Patient tolerance: patient tolerated the procedure well with no immediate complications  Comments: 1 cc dexamethasone, 0.5 cc kenalog 40          Lab Frequency Next Occurrence   Follow Anesthesia Guidelines / Standing Orders Once 09/05/2018       This document has been electronically signed by LEYLA Culp on February 9, 2022 09:38 CST

## 2022-02-08 ENCOUNTER — TELEPHONE (OUTPATIENT)
Dept: PODIATRY | Facility: CLINIC | Age: 77
End: 2022-02-08

## 2022-02-09 ENCOUNTER — OFFICE VISIT (OUTPATIENT)
Dept: PODIATRY | Facility: CLINIC | Age: 77
End: 2022-02-09

## 2022-02-09 VITALS
WEIGHT: 121.4 LBS | SYSTOLIC BLOOD PRESSURE: 122 MMHG | HEART RATE: 86 BPM | HEIGHT: 64 IN | OXYGEN SATURATION: 97 % | BODY MASS INDEX: 20.73 KG/M2 | DIASTOLIC BLOOD PRESSURE: 60 MMHG

## 2022-02-09 DIAGNOSIS — G57.61 PLANTAR NEUROMA OF RIGHT FOOT: Primary | ICD-10-CM

## 2022-02-09 DIAGNOSIS — B35.1 ONYCHOMYCOSIS: ICD-10-CM

## 2022-02-09 DIAGNOSIS — M79.671 FOOT PAIN, RIGHT: ICD-10-CM

## 2022-02-09 PROCEDURE — 64455 NJX AA&/STRD PLTR COM DG NRV: CPT | Performed by: NURSE PRACTITIONER

## 2022-02-09 PROCEDURE — 99213 OFFICE O/P EST LOW 20 MIN: CPT | Performed by: NURSE PRACTITIONER

## 2022-02-09 RX ORDER — TRIAMCINOLONE ACETONIDE 40 MG/ML
20 INJECTION, SUSPENSION INTRA-ARTICULAR; INTRAMUSCULAR ONCE
Status: COMPLETED | OUTPATIENT
Start: 2022-02-09 | End: 2022-02-09

## 2022-02-09 RX ORDER — ACYCLOVIR 400 MG/1
400 TABLET ORAL
COMMUNITY
End: 2022-05-16

## 2022-02-09 RX ORDER — DEXAMETHASONE SODIUM PHOSPHATE 10 MG/ML
10 INJECTION INTRAMUSCULAR; INTRAVENOUS ONCE
Status: COMPLETED | OUTPATIENT
Start: 2022-02-09 | End: 2022-02-09

## 2022-02-09 RX ORDER — TIZANIDINE 4 MG/1
4 TABLET ORAL NIGHTLY PRN
COMMUNITY

## 2022-02-09 RX ORDER — BUPIVACAINE HYDROCHLORIDE 5 MG/ML
1 INJECTION, SOLUTION PERINEURAL ONCE
Status: COMPLETED | OUTPATIENT
Start: 2022-02-09 | End: 2022-02-09

## 2022-02-09 RX ADMIN — DEXAMETHASONE SODIUM PHOSPHATE 10 MG: 10 INJECTION INTRAMUSCULAR; INTRAVENOUS at 08:28

## 2022-02-09 RX ADMIN — BUPIVACAINE HYDROCHLORIDE 1 ML: 5 INJECTION, SOLUTION PERINEURAL at 08:27

## 2022-02-09 RX ADMIN — TRIAMCINOLONE ACETONIDE 20 MG: 40 INJECTION, SUSPENSION INTRA-ARTICULAR; INTRAMUSCULAR at 08:28

## 2022-02-09 NOTE — PATIENT INSTRUCTIONS
Fungal Nail Infection  A fungal nail infection is a common infection of the toenails or fingernails. This condition affects toenails more often than fingernails. It often affects the great, or big, toes. More than one nail may be infected. The condition can be passed from person to person (is contagious).  What are the causes?  This condition is caused by a fungus. Several types of fungi can cause the infection. These fungi are common in moist and warm areas. If your hands or feet come into contact with the fungus, it may get into a crack in your fingernail or toenail and cause the infection.  What increases the risk?  The following factors may make you more likely to develop this condition:  · Being male.  · Being of older age.  · Living with someone who has the fungus.  · Walking barefoot in areas where the fungus thrives, such as showers or locker rooms.  · Wearing shoes and socks that cause your feet to sweat.  · Having a nail injury or a recent nail surgery.  · Having certain medical conditions, such as:  ? Athlete's foot.  ? Diabetes.  ? Psoriasis.  ? Poor circulation.  ? A weak body defense system (immune system).  What are the signs or symptoms?  Symptoms of this condition include:  · A pale spot on the nail.  · Thickening of the nail.  · A nail that becomes yellow or brown.  · A brittle or ragged nail edge.  · A crumbling nail.  · A nail that has lifted away from the nail bed.  How is this diagnosed?  This condition is diagnosed with a physical exam. Your health care provider may take a scraping or clipping from your nail to test for the fungus.  How is this treated?  Treatment is not needed for mild infections. If you have significant nail changes, treatment may include:  · Antifungal medicines taken by mouth (orally). You may need to take the medicine for several weeks or several months, and you may not see the results for a long time. These medicines can cause side effects. Ask your health care provider  what problems to watch for.  · Antifungal nail polish or nail cream. These may be used along with oral antifungal medicines.  · Laser treatment of the nail.  · Surgery to remove the nail. This may be needed for the most severe infections.  It can take a long time, usually up to a year, for the infection to go away. The infection may also come back.  Follow these instructions at home:  Medicines  · Take or apply over-the-counter and prescription medicines only as told by your health care provider.  · Ask your health care provider about using over-the-counter mentholated ointment on your nails.  Nail care  · Trim your nails often.  · Wash and dry your hands and feet every day.  · Keep your feet dry:  ? Wear absorbent socks, and change your socks frequently.  ? Wear shoes that allow air to circulate, such as sandals or canvas tennis shoes. Throw out old shoes.  · Do not use artificial nails.  · If you go to a nail salon, make sure you choose one that uses clean instruments.  · Use antifungal foot powder on your feet and in your shoes.  General instructions  · Do not share personal items, such as towels or nail clippers.  · Do not walk barefoot in shower rooms or locker rooms.  · Wear rubber gloves if you are working with your hands in wet areas.  · Keep all follow-up visits as told by your health care provider. This is important.  Contact a health care provider if:  Your infection is not getting better or it is getting worse after several months.  Summary  · A fungal nail infection is a common infection of the toenails or fingernails.  · Treatment is not needed for mild infections. If you have significant nail changes, treatment may include taking medicine orally and applying medicine to your nails.  · It can take a long time, usually up to a year, for the infection to go away. The infection may also come back.  · Take or apply over-the-counter and prescription medicines only as told by your health care  provider.  · Follow instructions for taking care of your nails to help prevent infection from coming back or spreading.  This information is not intended to replace advice given to you by your health care provider. Make sure you discuss any questions you have with your health care provider.  Document Revised: 04/09/2020 Document Reviewed: 05/24/2019  [employer] Patient Education © 2021 [employer]    Nielson Neuralgia    Nielson neuralgia is foot pain that affects the ball of the foot and the area near the toes. Nielson neuralgia occurs when part of a nerve in the foot (digital nerve) is under too much pressure (compressed). When this happens over a long period of time, the nerve can thicken (neuroma) and cause pain. Pain usually occurs between the third and fourth toes.   Nielson neuralgia can come and go but may get worse over time.  What are the causes?  This condition is caused by doing the same things over and over with your foot, such as:  · Activities such as running or jumping.  · Wearing shoes that are too tight.  What increases the risk?  You may be at higher risk for Nielson neuralgia if you:  · Are female.  · Wear high heels.  · Wear shoes that are narrow or tight.  · Do activities that repeatedly stretch your toes, such as:  ? Running.  ? Ballet.  ? Long-distance walking.  What are the signs or symptoms?  The first symptom of Nielson neuralgia is pain that spreads from the ball of the foot to the toes. It may feel like you are walking on a marble. Pain usually gets worse with walking and goes away at night. Other symptoms may include numbness and cramping of your toes. Both feet are equally affected, but rarely at the same time.  How is this diagnosed?  This condition is diagnosed based on your symptoms, your medical history, and a physical exam. Your health care provider may:  · Squeeze your foot just behind your toe.  · Ask you to move your toes to check for pain.  · Ask about your physical activity  level.  You also may have imaging tests, such as an X-ray, ultrasound, or MRI.  How is this treated?  Treatment depends on how severe your condition is and what causes it. Treatment may involve:  · Wearing different shoes that are not too tight, are low-heeled, and provide good support. For some people, this is the only treatment needed.  · Wearing an over-the-counter or custom supportive pad (orthotic) under the front of your foot.  · Getting injections of numbing medicine and anti-inflammatory medicine (steroid) in the nerve.  · Having surgery to remove part of the thickened nerve.  Follow these instructions at home:  Managing pain, stiffness, and swelling    · Massage your foot as needed.  · Wear orthotics as told by your health care provider.  · If directed, put ice on your foot:  ? Put ice in a plastic bag.  ? Place a towel between your skin and the bag.  ? Leave the ice on for 20 minutes, 2-3 times a day.  · Avoid activities that cause pain or make pain worse. If you play sports, ask your health care provider when it is safe for you to return to sports.  · Raise (elevate) your foot above the level of your heart while lying down and, when possible, while sitting.    General instructions  · Take over-the-counter and prescription medicines only as told by your health care provider.  · Do not drive or use heavy machinery while taking prescription pain medicine.  · Wear shoes that:  ? Have soft soles.  ? Have a wide toe area.  ? Provide arch support.  ? Do not pinch or squeeze your feet.  ? Have room for your orthotics, if applicable.  · Keep all follow-up visits as told by your health care provider. This is important.  Contact a health care provider if:  · Your symptoms get worse or do not get better with treatment and home care.  Summary  · Nielson neuralgia is foot pain that affects the ball of the foot and the area near the toes. Pain usually occurs between the third and fourth toes, gets worse with walking, and  goes away at night.  · Nielson neuralgia occurs when part of a nerve in the foot (digital nerve) is under too much pressure. When this happens over a long period of time, the nerve can thicken (neuroma) and cause pain.  · This condition is caused by doing the same things over and over with your foot, such as running or jumping, wearing shoes that are too tight, or wearing high heels.  · Treatment may involve wearing low-heeled shoes that are not too tight, wearing a supportive pad (orthotic) under the front of your foot, getting injections in the nerve, or having surgery to remove part of the thickened nerve.  This information is not intended to replace advice given to you by your health care provider. Make sure you discuss any questions you have with your health care provider.  Document Revised: 01/01/2019 Document Reviewed: 01/01/2019  [employer] Patient Education © 2021 [employer]    Fungal Nail Infection  A fungal nail infection is a common infection of the toenails or fingernails. This condition affects toenails more often than fingernails. It often affects the great, or big, toes. More than one nail may be infected. The condition can be passed from person to person (is contagious).  What are the causes?  This condition is caused by a fungus. Several types of fungi can cause the infection. These fungi are common in moist and warm areas. If your hands or feet come into contact with the fungus, it may get into a crack in your fingernail or toenail and cause the infection.  What increases the risk?  The following factors may make you more likely to develop this condition:  · Being male.  · Being of older age.  · Living with someone who has the fungus.  · Walking barefoot in areas where the fungus thrives, such as showers or locker rooms.  · Wearing shoes and socks that cause your feet to sweat.  · Having a nail injury or a recent nail surgery.  · Having certain medical conditions, such as:  ? Athlete's  foot.  ? Diabetes.  ? Psoriasis.  ? Poor circulation.  ? A weak body defense system (immune system).  What are the signs or symptoms?  Symptoms of this condition include:  · A pale spot on the nail.  · Thickening of the nail.  · A nail that becomes yellow or brown.  · A brittle or ragged nail edge.  · A crumbling nail.  · A nail that has lifted away from the nail bed.  How is this diagnosed?  This condition is diagnosed with a physical exam. Your health care provider may take a scraping or clipping from your nail to test for the fungus.  How is this treated?  Treatment is not needed for mild infections. If you have significant nail changes, treatment may include:  · Antifungal medicines taken by mouth (orally). You may need to take the medicine for several weeks or several months, and you may not see the results for a long time. These medicines can cause side effects. Ask your health care provider what problems to watch for.  · Antifungal nail polish or nail cream. These may be used along with oral antifungal medicines.  · Laser treatment of the nail.  · Surgery to remove the nail. This may be needed for the most severe infections.  It can take a long time, usually up to a year, for the infection to go away. The infection may also come back.  Follow these instructions at home:  Medicines  · Take or apply over-the-counter and prescription medicines only as told by your health care provider.  · Ask your health care provider about using over-the-counter mentholated ointment on your nails.  Nail care  · Trim your nails often.  · Wash and dry your hands and feet every day.  · Keep your feet dry:  ? Wear absorbent socks, and change your socks frequently.  ? Wear shoes that allow air to circulate, such as sandals or canvas tennis shoes. Throw out old shoes.  · Do not use artificial nails.  · If you go to a nail salon, make sure you choose one that uses clean instruments.  · Use antifungal foot powder on your feet and in  your shoes.  General instructions  · Do not share personal items, such as towels or nail clippers.  · Do not walk barefoot in shower rooms or locker rooms.  · Wear rubber gloves if you are working with your hands in wet areas.  · Keep all follow-up visits as told by your health care provider. This is important.  Contact a health care provider if:  Your infection is not getting better or it is getting worse after several months.  Summary  · A fungal nail infection is a common infection of the toenails or fingernails.  · Treatment is not needed for mild infections. If you have significant nail changes, treatment may include taking medicine orally and applying medicine to your nails.  · It can take a long time, usually up to a year, for the infection to go away. The infection may also come back.  · Take or apply over-the-counter and prescription medicines only as told by your health care provider.  · Follow instructions for taking care of your nails to help prevent infection from coming back or spreading.  This information is not intended to replace advice given to you by your health care provider. Make sure you discuss any questions you have with your health care provider.  Document Revised: 04/09/2020 Document Reviewed: 05/24/2019  [employer] Patient Education © 2021 [employer]  Ciclopirox nail solution  What is this medicine?  CICLOPIROX (sye kloe PEER ox) NAIL SOLUTION is an antifungal medicine. It used to treat fungal infections of the nails.  This medicine may be used for other purposes; ask your health care provider or pharmacist if you have questions.  COMMON BRAND NAME(S): BRICE, Penlac  What should I tell my health care provider before I take this medicine?  They need to know if you have any of these conditions:  · diabetes mellitus  · history of seizures  · HIV infection  · immune system problems or organ transplant  · large areas of burned or damaged skin  · peripheral vascular disease or poor  circulation  · taking corticosteroid medication (including steroid inhalers, cream, or lotion)  · an unusual or allergic reaction to ciclopirox, isopropyl alcohol, other medicines, foods, dyes, or preservatives  · pregnant or trying to get pregnant  · breast-feeding  How should I use this medicine?  This medicine is for external use only. Follow the directions that come with this medicine exactly. Wash and dry your hands before use. Avoid contact with the eyes, mouth or nose. If you do get this medicine in your eyes, rinse out with plenty of cool tap water. Contact your doctor or health care professional if eye irritation occurs. Use at regular intervals. Do not use your medicine more often than directed. Finish the full course prescribed by your doctor or health care professional even if you think you are better. Do not stop using except on your doctor's advice.  Talk to your pediatrician regarding the use of this medicine in children. While this medicine may be prescribed for children as young as 12 years for selected conditions, precautions do apply.  Overdosage: If you think you have taken too much of this medicine contact a poison control center or emergency room at once.  NOTE: This medicine is only for you. Do not share this medicine with others.  What if I miss a dose?  If you miss a dose, use it as soon as you can. If it is almost time for your next dose, use only that dose. Do not use double or extra doses.  What may interact with this medicine?  Interactions are not expected. Do not use any other skin products without telling your doctor or health care professional.  This list may not describe all possible interactions. Give your health care provider a list of all the medicines, herbs, non-prescription drugs, or dietary supplements you use. Also tell them if you smoke, drink alcohol, or use illegal drugs. Some items may interact with your medicine.  What should I watch for while using this medicine?  Tell  your doctor or health care professional if your symptoms get worse. Four to six months of treatment may be needed for the nail(s) to improve. Some people may not achieve a complete cure or clearing of the nails by this time.  Tell your doctor or health care professional if you develop sores or blisters that do not heal properly. If your nail infection returns after stopping using this product, contact your doctor or health care professional.  What side effects may I notice from receiving this medicine?  Side effects that you should report to your doctor or health care professional as soon as possible:  · allergic reactions like skin rash, itching or hives, swelling of the face, lips, or tongue  · severe irritation, redness, burning, blistering, peeling, swelling, oozing  Side effects that usually do not require medical attention (report to your doctor or health care professional if they continue or are bothersome):  · mild reddening of the skin  · nail discoloration  · temporary burning or mild stinging at the site of application  This list may not describe all possible side effects. Call your doctor for medical advice about side effects. You may report side effects to FDA at 3-724-FDA-3971.  Where should I keep my medicine?  Keep out of the reach of children.  Store at room temperature between 15 and 30 degrees C (59 and 86 degrees F). Do not freeze. Protect from light by storing the bottle in the carton after every use. This medicine is flammable. Keep away from heat and flame. Throw away any unused medicine after the expiration date.  NOTE: This sheet is a summary. It may not cover all possible information. If you have questions about this medicine, talk to your doctor, pharmacist, or health care provider.  © 2021 [employer] (2009-03-23 16:49:20)

## 2022-03-07 NOTE — PROGRESS NOTES
[facility] - PODIATRY    Today's Date: 03/09/22    Patient Name: Mary Ann Jones  MRN: [id number]  [facility]: [id number]  PCP: Marcos Hernandez APRN  Referring Provider: No ref. provider found    SUBJECTIVE     Chief Complaint   Patient presents with   • Follow-up     Marcos Hernandez APRN pcp12/29/2021 1 month fu - pt states has pad underneath 3rd and 4th toe, without the pad it does hurt, and has helped at night - pt denies pain, during the day 8/10 - pt presents follow up with xrays, pain in right foot      HPI: Mary Ann Jones, a 76 y.o.female, comes to clinic as a(n) established patient for follow-up treatment of Plantar neuroma, onychomycosis. Patient has h/o Vitas, chronic back pain, depression, GERD, hypertension, hypothyroidism. Patient presents for 1 month follow-up for foot pain in the right foot secondary to plantar neuroma.  Patient states that she did have some improvement for several days after last injection.  Patient states that she was unable to find metatarsal cushion at any of the local pharmacies and attempted to make her own cushion, however, she has been wearing it underneath the toes instead of underneath the ball of the foot.  Has been applying ciclopirox as discussed without issue.  Denies issues with the nail today. Admits pain at 8/10 level and described as shooting, aching and sharp. Relates previous treatment(s) including Neuroma injection. Denies any constitutional symptoms. No other pedal complaints at this time.    Past Medical History:   Diagnosis Date   • Allergic rhinitis    • Arthritis    • Chronic back pain    • Depression    • GERD (gastroesophageal reflux disease)    • Hypertension     mild    • Hypothyroidism    • Insomnia    • Melanoma (HCC)    • Muscle spasms of both lower extremities     and back    • PONV (postoperative nausea and vomiting)    • Thyroid cancer (HCC)      Past Surgical History:   Procedure Laterality Date   • ANTERIOR AND POSTERIOR VAGINAL  REPAIR N/A 9/17/2018    Procedure: ANTERIOR AND POSTERIOR VAGINAL REPAIR;  Surgeon: Margaux Pearson MD;  Location:  [facility] OR;  Service: Obstetrics/Gynecology   • APPENDECTOMY     • BREAST RECONSTRUCTION     • BREAST SURGERY      Ede mastectomy   • CARDIAC CATHETERIZATION     • CHOLECYSTECTOMY     • COLONOSCOPY     • ESOPHAGEAL DILATATION     • HYSTERECTOMY     • MID-URETHRAL SLING WITH CYSTOSCOPY N/A 9/17/2018    Procedure: MID-URETHRAL SLING WITH CYSTOSCOPY;  Surgeon: Margaux Pearson MD;  Location:  [facility] OR;  Service: Obstetrics/Gynecology   • SKIN CANCER EXCISION     • TOTAL THYROIDECTOMY       Family History   Problem Relation Age of Onset   • Cancer Mother         breast   • Alzheimer's disease Father    • Heart attack Neg Hx    • Heart disease Neg Hx    • Heart failure Neg Hx      Social History     Socioeconomic History   • Marital status:    Tobacco Use   • Smoking status: Former Smoker     Years: 30.00     Types: Cigarettes   • Smokeless tobacco: Never Used   Vaping Use   • Vaping Use: Never used   Substance and Sexual Activity   • Alcohol use: Yes     Comment: socially   • Drug use: No   • Sexual activity: Defer     No Known Allergies  Current Outpatient Medications   Medication Sig Dispense Refill   • acyclovir (ZOVIRAX) 400 MG tablet Take 400 mg by mouth Every 4 (Four) Hours While Awake. Take no more than 5 doses a day.     • ciclopirox (PENLAC) 8 % solution Apply  topically to the appropriate area as directed Every Night for 336 days. Apply as directed to affected toenails. 6 mL 5   • estradiol (ESTRACE) 2 MG tablet Take 2 mg by mouth Daily.     • hydrochlorothiazide (HYDRODIURIL) 12.5 MG tablet 12.5 mg Daily.     • levothyroxine (SYNTHROID, LEVOTHROID) 125 MCG tablet Take 1 tablet by mouth Daily. 90 tablet 3   • tiZANidine (ZANAFLEX) 4 MG tablet Take 4 mg by mouth At Night As Needed for Muscle Spasms.       No current facility-administered medications for this visit.     Review of Systems    Constitutional: Negative for chills and fever.   HENT: Negative for congestion.    Respiratory: Negative for shortness of breath.    Cardiovascular: Negative for chest pain and leg swelling.   Gastrointestinal: Negative for constipation, diarrhea, nausea and vomiting.   Musculoskeletal: Positive for arthralgias. Negative for myalgias.        Right foot pain   Skin: Negative for wound.   Neurological: Negative for numbness.       OBJECTIVE     Vitals:    03/09/22 0821   BP: 125/80   Pulse: 88   SpO2: 99%       PHYSICAL EXAM  GEN:   Accompanied by none.     Foot/Ankle Exam:       General:   Appearance: appears stated age and healthy    Orientation: AAOx3    Affect: appropriate    Gait: unimpaired    Assistance: independent    Shoe Gear:  Casual shoes    VASCULAR      Right Foot Vascularity   Dorsalis pedis:  2+  Posterior tibial:  2+  Skin Temperature: warm    Edema Grading:  None  CFT:  3  Pedal Hair Growth:  Present  Varicosities: mild varicosities       Left Foot Vascularity   Dorsalis pedis:  2+  Posterior tibial:  2+  Skin Temperature: warm    Edema Grading:  None  CFT:  3  Pedal Hair Growth:  Present  Varicosities: mild varicosities        NEUROLOGIC     Right Foot Neurologic   Normal sensation    Light touch sensation:  Normal  Vibratory sensation:  Normal  Hot/Cold sensation: normal    Protective Sensation using [place] Monofilament:  10     Left Foot Neurologic   Normal sensation    Light touch sensation:  Normal  Vibratory sensation:  Normal  Hot/cold sensation: normal    Protective Sensation using [place]eth Monofilament:  10     MUSCULOSKELETAL      Right Foot Musculoskeletal   Ecchymosis:  None  Tenderness: neuroma    Tenderness comment:  Tenderness in second and third interspace, increased at second  Arch:  Normal  Hallux valgus: No       Left Foot Musculoskeletal   Ecchymosis:  None  Tenderness: none    Arch:  Normal  Hallux valgus: No       MUSCLE STRENGTH     Right Foot Muscle  Strength   Foot dorsiflexion:  5  Foot plantar flexion:  5  Foot inversion:  5  Foot eversion:  5     Left Foot Muscle Strength   Foot dorsiflexion:  5  Foot plantar flexion:  5  Foot inversion:  5  Foot eversion:  5     RANGE OF MOTION      Right Foot Range of Motion   Foot and ankle ROM within normal limits       Left Foot Range of Motion   Foot and ankle ROM within normal limits       DERMATOLOGIC     Right Foot Dermatologic   Skin: skin intact and atrophic    Nails: onychomycosis and dystrophic nails    Nails: no ingrown toenail    Nails comment:  Hallux     Left Foot Dermatologic   Skin: skin intact and atrophic    Nails: onychomycosis and dystrophic nails    Nails: no ingrown toenail    Nails comment:  Hallux     Image:       RADIOLOGY/NUCLEAR:  No results found.    LABORATORY/CULTURE RESULTS:      PATHOLOGY RESULTS:       ASSESSMENT/PLAN     Diagnoses and all orders for this visit:    1. Plantar neuroma of right foot (Primary)  -     bupivacaine (MARCAINE) 0.5 % injection 1 mL  -     dexamethasone (DECADRON) injection 10 mg  -     triamcinolone acetonide (KENALOG-40) injection 20 mg    2. Onychomycosis    3. Foot pain, right      Comprehensive lower extremity examination and evaluation was performed.  Discussed findings and treatment plan including risks, benefits, and treatment options with patient in detail. Patient agreed with treatment plan.  Findings continue to remain consistent with plantar neuroma of the right foot.  Increased pain at the second interspace today versus the third.  X-rays from [facility] reviewed with possible narrowing between the second and third metatarsal which could indicate increased area of discomfort noted on today's exam.    After written consent obtained, plantar neuroma injection performed as documented in procedure notes.  Patient may maintain nails and calluses at home utilizing emery board or pumice stone between visits as needed   Continue ciclopirox.  Dispensed metatarsal  cushion.  We will schedule with Dr. Davis in 1 month to discuss surgical excision.  An After Visit Summary was printed and given to the patient at discharge, including (if requested) any available informative/educational handouts regarding diagnosis, treatment, or medications. All questions were answered to patient/family satisfaction. Should symptoms fail to improve or worsen they agree to call or return to clinic or to go to the Emergency Department. Discussed the importance of following up with any needed screening tests/labs/specialist appointments and any requested follow-up recommended by me today. Importance of maintaining follow-up discussed and patient accepts that missed appointments can delay diagnosis and potentially lead to worsening of conditions.  Return in about 1 month (around 4/9/2022) for Follow-up with Dr. Davis., or sooner if acute issues arise.    Nerve Block    Date/Time: 3/9/2022 8:45 AM  Performed by: Duke Armenta APRN  Authorized by: Duke Armenta APRN   Indications: pain relief  Body area: lower extremity  Nerve: plantar  Laterality: right    Sedation:  Patient sedated: no    Preparation: Patient was prepped and draped in the usual sterile fashion.  Patient position: sitting  Needle size: 25 G  Location technique: anatomical landmarks  Local Anesthetic: bupivacaine 0.5% without epinephrine  Anesthetic total: 1 mL  Outcome: pain improved  Patient tolerance: patient tolerated the procedure well with no immediate complications  Comments: 1 cc dexamethasone, 0.5 cc Kenalog 40          Lab Frequency Next Occurrence   Follow Anesthesia Guidelines / Standing Orders Once 09/05/2018       This document has been electronically signed by LEYLA Culp on March 9, 2022 09:26 CST

## 2022-03-09 ENCOUNTER — OFFICE VISIT (OUTPATIENT)
Dept: PODIATRY | Facility: CLINIC | Age: 77
End: 2022-03-09

## 2022-03-09 VITALS
DIASTOLIC BLOOD PRESSURE: 80 MMHG | HEART RATE: 88 BPM | HEIGHT: 64 IN | OXYGEN SATURATION: 99 % | BODY MASS INDEX: 20.59 KG/M2 | WEIGHT: 120.6 LBS | SYSTOLIC BLOOD PRESSURE: 125 MMHG

## 2022-03-09 DIAGNOSIS — G57.61 PLANTAR NEUROMA OF RIGHT FOOT: Primary | ICD-10-CM

## 2022-03-09 DIAGNOSIS — M79.671 FOOT PAIN, RIGHT: ICD-10-CM

## 2022-03-09 DIAGNOSIS — B35.1 ONYCHOMYCOSIS: ICD-10-CM

## 2022-03-09 PROCEDURE — 99212 OFFICE O/P EST SF 10 MIN: CPT | Performed by: NURSE PRACTITIONER

## 2022-03-09 PROCEDURE — 64455 NJX AA&/STRD PLTR COM DG NRV: CPT | Performed by: NURSE PRACTITIONER

## 2022-03-09 RX ORDER — DEXAMETHASONE SODIUM PHOSPHATE 10 MG/ML
10 INJECTION INTRAMUSCULAR; INTRAVENOUS ONCE
Status: COMPLETED | OUTPATIENT
Start: 2022-03-09 | End: 2022-03-09

## 2022-03-09 RX ORDER — TRIAMCINOLONE ACETONIDE 40 MG/ML
20 INJECTION, SUSPENSION INTRA-ARTICULAR; INTRAMUSCULAR ONCE
Status: COMPLETED | OUTPATIENT
Start: 2022-03-09 | End: 2022-03-09

## 2022-03-09 RX ORDER — BUPIVACAINE HYDROCHLORIDE 5 MG/ML
1 INJECTION, SOLUTION PERINEURAL ONCE
Status: COMPLETED | OUTPATIENT
Start: 2022-03-09 | End: 2022-03-09

## 2022-03-09 RX ADMIN — TRIAMCINOLONE ACETONIDE 20 MG: 40 INJECTION, SUSPENSION INTRA-ARTICULAR; INTRAMUSCULAR at 08:42

## 2022-03-09 RX ADMIN — DEXAMETHASONE SODIUM PHOSPHATE 10 MG: 10 INJECTION INTRAMUSCULAR; INTRAVENOUS at 08:41

## 2022-03-09 RX ADMIN — BUPIVACAINE HYDROCHLORIDE 1 ML: 5 INJECTION, SOLUTION PERINEURAL at 08:41

## 2022-03-14 ENCOUNTER — TELEPHONE (OUTPATIENT)
Dept: PODIATRY | Facility: CLINIC | Age: 77
End: 2022-03-14

## 2022-03-14 NOTE — TELEPHONE ENCOUNTER
Pt called in regards of having severe pain in right hallux through 3rd toes, the pads are not helping, wanting a suggest of what to do from jose. I will be calling her back once I know.

## 2022-03-14 NOTE — TELEPHONE ENCOUNTER
Pt called in regards of having severe pain in right hallux through 3rd toes, the pads are not helping, wanting a suggest of what she should do. Please advise.

## 2022-03-15 ENCOUNTER — TELEPHONE (OUTPATIENT)
Dept: PODIATRY | Facility: CLINIC | Age: 77
End: 2022-03-15

## 2022-03-15 NOTE — TELEPHONE ENCOUNTER
Called pt in regards of Staying off of the foot as much as possible. She has a follow-up with Susan to discuss possible surgical removal of neuroma. Not really much else other than padding, rest, and topical pain creams that she can do at this point.   According to Jono.

## 2022-04-11 NOTE — PROGRESS NOTES
[facility] - PODIATRY    Today's Date: 04/12/22    Patient Name: Mary Ann Jones  MRN: [id number]  CSN: [id number]  PCP: Marcos Hernandez APRN  Referring Provider: No ref. provider found    SUBJECTIVE     Chief Complaint   Patient presents with   • Follow-up     Marcos Hernandez APRN pcp12/29/2021 1 month fu with Susan - PT STATES has the pad on right foot and has helped a lot, when walking it hurts the worse - pt pain 3/10, when walking 8/10 - pt presents right foot pain, with pad on bottom of foot     HPI: Mary Ann Jones, a 76 y.o.female, comes to clinic as a(n) established patient for follow-up treatment of Plantar neuroma. Patient has h/o Vitas, chronic back pain, depression, GERD, hypertension, hypothyroidism. Patient presents for re-evaluation and possible surgical discussion of right foot plantar neuroma. Has had injections previously which did provide relief of symptoms for a period of time. Has also been wearing metatarsal cushion since last visit which she notes does help with pain. Pain is most prevalent with walking.  She is interested in surgical excision to improve pain. Admits pain at 8/10 level and described as shooting, aching and sharp. Relates previous treatment(s) including Neuroma injection, metatarsal cushion. Denies any constitutional symptoms. No other pedal complaints at this time.    Past Medical History:   Diagnosis Date   • Allergic rhinitis    • Arthritis    • Chronic back pain    • Depression    • GERD (gastroesophageal reflux disease)    • Hypertension     mild    • Hypothyroidism    • Insomnia    • Melanoma (HCC)    • Muscle spasms of both lower extremities     and back    • PONV (postoperative nausea and vomiting)    • Thyroid cancer (HCC)      Past Surgical History:   Procedure Laterality Date   • ANTERIOR AND POSTERIOR VAGINAL REPAIR N/A 9/17/2018    Procedure: ANTERIOR AND POSTERIOR VAGINAL REPAIR;  Surgeon: aMrgaux Pearson MD;  Location: [facility] OR;   Service: Obstetrics/Gynecology   • APPENDECTOMY     • BREAST RECONSTRUCTION     • BREAST SURGERY      Ede mastectomy   • CARDIAC CATHETERIZATION     • CHOLECYSTECTOMY     • COLONOSCOPY     • ESOPHAGEAL DILATATION     • HYSTERECTOMY     • MID-URETHRAL SLING WITH CYSTOSCOPY N/A 9/17/2018    Procedure: MID-URETHRAL SLING WITH CYSTOSCOPY;  Surgeon: Margaux Pearson MD;  Location: [facility] OR;  Service: Obstetrics/Gynecology   • SKIN CANCER EXCISION     • TOTAL THYROIDECTOMY       Family History   Problem Relation Age of Onset   • Cancer Mother         breast   • Alzheimer's disease Father    • Heart attack Neg Hx    • Heart disease Neg Hx    • Heart failure Neg Hx      Social History     Socioeconomic History   • Marital status:    Tobacco Use   • Smoking status: Former Smoker     Years: 30.00     Types: Cigarettes   • Smokeless tobacco: Never Used   Vaping Use   • Vaping Use: Never used   Substance and Sexual Activity   • Alcohol use: Yes     Comment: socially   • Drug use: No   • Sexual activity: Defer     No Known Allergies  Current Outpatient Medications   Medication Sig Dispense Refill   • acyclovir (ZOVIRAX) 400 MG tablet Take 400 mg by mouth Every 4 (Four) Hours While Awake. Take no more than 5 doses a day.     • ciclopirox (PENLAC) 8 % solution Apply  topically to the appropriate area as directed Every Night for 336 days. Apply as directed to affected toenails. 6 mL 5   • estradiol (ESTRACE) 2 MG tablet Take 2 mg by mouth Daily.     • hydrochlorothiazide (HYDRODIURIL) 12.5 MG tablet 12.5 mg Daily.     • levothyroxine (SYNTHROID, LEVOTHROID) 125 MCG tablet Take 1 tablet by mouth Daily. 90 tablet 3   • tiZANidine (ZANAFLEX) 4 MG tablet Take 4 mg by mouth At Night As Needed for Muscle Spasms.       No current facility-administered medications for this visit.     Review of Systems   Constitutional: Negative for chills and fever.   HENT: Negative for congestion.    Respiratory: Negative for shortness of breath.     Cardiovascular: Negative for chest pain and leg swelling.   Gastrointestinal: Negative for constipation, diarrhea, nausea and vomiting.   Musculoskeletal: Positive for arthralgias. Negative for myalgias.        Right foot pain   Skin: Negative for wound.   Neurological: Negative for numbness.       OBJECTIVE     Vitals:    04/12/22 0833   BP: 122/80   Pulse: 85   SpO2: 99%       PHYSICAL EXAM  GEN:   Accompanied by none.     Physical Exam  Vitals reviewed.   Constitutional:       Appearance: Normal appearance. She is well-developed.   HENT:      Head: Normocephalic and atraumatic.      Right Ear: Tympanic membrane normal.      Left Ear: Tympanic membrane normal.      Nose: Nose normal.      Mouth/Throat:      Pharynx: Oropharynx is clear.   Eyes:      Extraocular Movements: Extraocular movements intact.      Pupils: Pupils are equal, round, and reactive to light.   Cardiovascular:      Rate and Rhythm: Normal rate and regular rhythm.      Pulses: Normal pulses.           Dorsalis pedis pulses are 2+ on the right side and 2+ on the left side.        Posterior tibial pulses are 2+ on the right side and 2+ on the left side.      Heart sounds: Normal heart sounds.   Pulmonary:      Effort: Pulmonary effort is normal.      Breath sounds: Normal breath sounds.   Abdominal:      General: Bowel sounds are normal.      Palpations: Abdomen is soft.   Musculoskeletal:      Cervical back: Normal range of motion and neck supple.      Right foot: No bunion.      Left foot: No bunion.   Feet:      Right foot:      Protective Sensation: 10 sites sensed.      Skin integrity: Warmth present.      Toenail Condition: ingrown     Left foot:      Protective Sensation: 10 sites sensed.      Skin integrity: Warmth present.      Toenail Condition: ingrown  Neurological:      General: No focal deficit present.      Mental Status: She is alert and oriented to person, place, and time. Mental status is at baseline.   Psychiatric:         Mood  and Affect: Mood normal.         Behavior: Behavior normal.         Thought Content: Thought content normal.         Judgment: Judgment normal.         Foot/Ankle Exam:       General:   Appearance: appears stated age and healthy    Orientation: AAOx3    Affect: appropriate    Gait: unimpaired    Assistance: independent    Shoe Gear:  Casual shoes    VASCULAR      Right Foot Vascularity   Dorsalis pedis:  2+  Posterior tibial:  2+  Skin Temperature: warm    Edema Grading:  None  CFT:  3  Pedal Hair Growth:  Present  Varicosities: moderate varicosities       Left Foot Vascularity   Dorsalis pedis:  2+  Posterior tibial:  2+  Skin Temperature: warm    Edema Grading:  None  CFT:  3  Pedal Hair Growth:  Present  Varicosities: moderate varicosities        NEUROLOGIC     Right Foot Neurologic   Normal sensation    Light touch sensation:  Normal  Vibratory sensation:  Normal  Hot/Cold sensation: normal    Protective Sensation using [place]-Yaneth Monofilament:  10     Left Foot Neurologic   Normal sensation    Light touch sensation:  Normal  Vibratory sensation:  Normal  Hot/cold sensation: normal    Protective Sensation using [place]-Yaneth Monofilament:  10     MUSCULOSKELETAL      Right Foot Musculoskeletal   Ecchymosis:  None  Tenderness: neuroma    Tenderness comment:  Tenderness in second and third interspace  Arch:  Normal  Hallux valgus: No       Left Foot Musculoskeletal   Ecchymosis:  None  Tenderness: none    Arch:  Normal  Hallux valgus: No       MUSCLE STRENGTH     Right Foot Muscle Strength   Foot dorsiflexion:  5  Foot plantar flexion:  5  Foot inversion:  5  Foot eversion:  5     Left Foot Muscle Strength   Foot dorsiflexion:  5  Foot plantar flexion:  5  Foot inversion:  5  Foot eversion:  5     RANGE OF MOTION      Right Foot Range of Motion   Foot and ankle ROM within normal limits       Left Foot Range of Motion   Foot and ankle ROM within normal limits       DERMATOLOGIC     Right Foot Dermatologic    Skin: skin intact and atrophic    Nails: onychomycosis and dystrophic nails    Nails: no ingrown toenail    Nails comment:  Hallux     Left Foot Dermatologic   Skin: skin intact and atrophic    Nails: onychomycosis and dystrophic nails    Nails: no ingrown toenail    Nails comment:  Hallux     Image:       RADIOLOGY/NUCLEAR:  No results found.    LABORATORY/CULTURE RESULTS:      PATHOLOGY RESULTS:       ASSESSMENT/PLAN     Diagnoses and all orders for this visit:    1. Plantar neuroma of right foot (Primary)  -     COVID PRE-OP / PRE-PROCEDURE SCREENING ORDER (NO ISOLATION) - Swab, Nasopharynx; Future  -     Case Request; Standing  -     Instructions on coughing, deep breathing, and incentive spirometry.; Future  -     CBC & Differential; Future  -     Basic Metabolic Panel; Future  -     ECG 12 Lead; Future  -     XR chest 2 vw; Future  -     Case Request    2. Foot pain, right    Other orders  -     Follow Anesthesia Guidelines / Protocol; Future  -     Obtain Informed Consent; Future  -     Provide Instructions to Patient Regarding NPO Status; Future  -     Chlorhexidine Skin Prep - Educate and Review With Patient; Future  -     Provide NPO Instructions to Patient      Comprehensive lower extremity examination and evaluation was performed.  Discussed findings and treatment plan including risks, benefits, and treatment options with patient in detail. Patient agreed with treatment plan.  Reviewed x-rays with patient.   Findings are consistent of plantar neuroma of the right third interspace.  Patient wishes to forego any additional conservative therapy and proceed with surgical intervention.  Patient will be scheduled for a right foot third interspace neuroma excision.  All options, benefits, and risks associate with surgery, discussed with the patient including but not limited to: Standard risk anesthesia, pain, bleeding, infection, nonhealing/dehiscence, stump neuroma, metatarsal fracture, loss of limb or  life.  Pre and postoperative courses were discussed in detail including minimum 1 week nonweightbearing status postoperatively.  No guarantees were inferred.  An After Visit Summary was printed and given to the patient at discharge, including (if requested) any available informative/educational handouts regarding diagnosis, treatment, or medications. All questions were answered to patient/family satisfaction. Should symptoms fail to improve or worsen they agree to call or return to clinic or to go to the Emergency Department. Discussed the importance of following up with any needed screening tests/labs/specialist appointments and any requested follow-up recommended by me today. Importance of maintaining follow-up discussed and patient accepts that missed appointments can delay diagnosis and potentially lead to worsening of conditions.  Return for Post-Op appointment., or sooner if acute issues arise.    Procedures    Lab Frequency Next Occurrence   Follow Anesthesia Guidelines / Standing Orders Once 09/05/2018       This document has been electronically signed by Jani Davis DPM on April 12, 2022 13:00 CDT

## 2022-04-12 ENCOUNTER — OFFICE VISIT (OUTPATIENT)
Dept: PODIATRY | Facility: CLINIC | Age: 77
End: 2022-04-12

## 2022-04-12 ENCOUNTER — PATIENT ROUNDING (BHMG ONLY) (OUTPATIENT)
Dept: PODIATRY | Facility: CLINIC | Age: 77
End: 2022-04-12

## 2022-04-12 VITALS
SYSTOLIC BLOOD PRESSURE: 122 MMHG | HEIGHT: 64 IN | HEART RATE: 85 BPM | DIASTOLIC BLOOD PRESSURE: 80 MMHG | WEIGHT: 116.8 LBS | BODY MASS INDEX: 19.94 KG/M2 | OXYGEN SATURATION: 99 %

## 2022-04-12 DIAGNOSIS — M79.671 FOOT PAIN, RIGHT: ICD-10-CM

## 2022-04-12 DIAGNOSIS — G57.61 PLANTAR NEUROMA OF RIGHT FOOT: Primary | ICD-10-CM

## 2022-04-12 PROCEDURE — 99214 OFFICE O/P EST MOD 30 MIN: CPT | Performed by: PODIATRIST

## 2022-04-12 NOTE — H&P
[facility] - PODIATRY    Today's Date: 04/12/22    Patient Name: Mary Ann Jones  MRN: [id number]  CSN: [id number]  PCP: Marcos Hernandez APRN  Referring Provider: No ref. provider found    SUBJECTIVE     Chief Complaint   Patient presents with   • Follow-up     Marcos Hernandez APRN pcp12/29/2021 1 month fu with Susan - PT STATES has the pad on right foot and has helped a lot, when walking it hurts the worse - pt pain 3/10, when walking 8/10 - pt presents right foot pain, with pad on bottom of foot     HPI: Mary Ann Jones, a 76 y.o.female, comes to clinic as a(n) established patient for follow-up treatment of Plantar neuroma. Patient has h/o Vitas, chronic back pain, depression, GERD, hypertension, hypothyroidism. Patient presents for re-evaluation and possible surgical discussion of right foot plantar neuroma. Has had injections previously which did provide relief of symptoms for a period of time. Has also been wearing metatarsal cushion since last visit which she notes does help with pain. Pain is most prevalent with walking.  She is interested in surgical excision to improve pain. Admits pain at 8/10 level and described as shooting, aching and sharp. Relates previous treatment(s) including Neuroma injection, metatarsal cushion. Denies any constitutional symptoms. No other pedal complaints at this time.    Past Medical History:   Diagnosis Date   • Allergic rhinitis    • Arthritis    • Chronic back pain    • Depression    • GERD (gastroesophageal reflux disease)    • Hypertension     mild    • Hypothyroidism    • Insomnia    • Melanoma (HCC)    • Muscle spasms of both lower extremities     and back    • PONV (postoperative nausea and vomiting)    • Thyroid cancer (HCC)      Past Surgical History:   Procedure Laterality Date   • ANTERIOR AND POSTERIOR VAGINAL REPAIR N/A 9/17/2018    Procedure: ANTERIOR AND POSTERIOR VAGINAL REPAIR;  Surgeon: Margaux Pearson MD;  Location: [facility] OR;   Service: Obstetrics/Gynecology   • APPENDECTOMY     • BREAST RECONSTRUCTION     • BREAST SURGERY      Ede mastectomy   • CARDIAC CATHETERIZATION     • CHOLECYSTECTOMY     • COLONOSCOPY     • ESOPHAGEAL DILATATION     • HYSTERECTOMY     • MID-URETHRAL SLING WITH CYSTOSCOPY N/A 9/17/2018    Procedure: MID-URETHRAL SLING WITH CYSTOSCOPY;  Surgeon: Margaux Pearson MD;  Location: [facility] OR;  Service: Obstetrics/Gynecology   • SKIN CANCER EXCISION     • TOTAL THYROIDECTOMY       Family History   Problem Relation Age of Onset   • Cancer Mother         breast   • Alzheimer's disease Father    • Heart attack Neg Hx    • Heart disease Neg Hx    • Heart failure Neg Hx      Social History     Socioeconomic History   • Marital status:    Tobacco Use   • Smoking status: Former Smoker     Years: 30.00     Types: Cigarettes   • Smokeless tobacco: Never Used   Vaping Use   • Vaping Use: Never used   Substance and Sexual Activity   • Alcohol use: Yes     Comment: socially   • Drug use: No   • Sexual activity: Defer     No Known Allergies  Current Outpatient Medications   Medication Sig Dispense Refill   • acyclovir (ZOVIRAX) 400 MG tablet Take 400 mg by mouth Every 4 (Four) Hours While Awake. Take no more than 5 doses a day.     • ciclopirox (PENLAC) 8 % solution Apply  topically to the appropriate area as directed Every Night for 336 days. Apply as directed to affected toenails. 6 mL 5   • estradiol (ESTRACE) 2 MG tablet Take 2 mg by mouth Daily.     • hydrochlorothiazide (HYDRODIURIL) 12.5 MG tablet 12.5 mg Daily.     • levothyroxine (SYNTHROID, LEVOTHROID) 125 MCG tablet Take 1 tablet by mouth Daily. 90 tablet 3   • tiZANidine (ZANAFLEX) 4 MG tablet Take 4 mg by mouth At Night As Needed for Muscle Spasms.       No current facility-administered medications for this visit.     Review of Systems   Constitutional: Negative for chills and fever.   HENT: Negative for congestion.    Respiratory: Negative for shortness of breath.     Cardiovascular: Negative for chest pain and leg swelling.   Gastrointestinal: Negative for constipation, diarrhea, nausea and vomiting.   Musculoskeletal: Positive for arthralgias. Negative for myalgias.        Right foot pain   Skin: Negative for wound.   Neurological: Negative for numbness.       OBJECTIVE     Vitals:    04/12/22 0833   BP: 122/80   Pulse: 85   SpO2: 99%       PHYSICAL EXAM  GEN:   Accompanied by none.     Physical Exam  Vitals reviewed.   Constitutional:       Appearance: Normal appearance. She is well-developed.   HENT:      Head: Normocephalic and atraumatic.      Right Ear: Tympanic membrane normal.      Left Ear: Tympanic membrane normal.      Nose: Nose normal.      Mouth/Throat:      Pharynx: Oropharynx is clear.   Eyes:      Extraocular Movements: Extraocular movements intact.      Pupils: Pupils are equal, round, and reactive to light.   Cardiovascular:      Rate and Rhythm: Normal rate and regular rhythm.      Pulses: Normal pulses.           Dorsalis pedis pulses are 2+ on the right side and 2+ on the left side.        Posterior tibial pulses are 2+ on the right side and 2+ on the left side.      Heart sounds: Normal heart sounds.   Pulmonary:      Effort: Pulmonary effort is normal.      Breath sounds: Normal breath sounds.   Abdominal:      General: Bowel sounds are normal.      Palpations: Abdomen is soft.   Musculoskeletal:      Cervical back: Normal range of motion and neck supple.      Right foot: No bunion.      Left foot: No bunion.   Feet:      Right foot:      Protective Sensation: 10 sites sensed.      Skin integrity: Warmth present.      Toenail Condition: ingrown     Left foot:      Protective Sensation: 10 sites sensed.      Skin integrity: Warmth present.      Toenail Condition: ingrown  Neurological:      General: No focal deficit present.      Mental Status: She is alert and oriented to person, place, and time. Mental status is at baseline.   Psychiatric:         Mood  and Affect: Mood normal.         Behavior: Behavior normal.         Thought Content: Thought content normal.         Judgment: Judgment normal.         Foot/Ankle Exam:       General:   Appearance: appears stated age and healthy    Orientation: AAOx3    Affect: appropriate    Gait: unimpaired    Assistance: independent    Shoe Gear:  Casual shoes    VASCULAR      Right Foot Vascularity   Dorsalis pedis:  2+  Posterior tibial:  2+  Skin Temperature: warm    Edema Grading:  None  CFT:  3  Pedal Hair Growth:  Present  Varicosities: moderate varicosities       Left Foot Vascularity   Dorsalis pedis:  2+  Posterior tibial:  2+  Skin Temperature: warm    Edema Grading:  None  CFT:  3  Pedal Hair Growth:  Present  Varicosities: moderate varicosities        NEUROLOGIC     Right Foot Neurologic   Normal sensation    Light touch sensation:  Normal  Vibratory sensation:  Normal  Hot/Cold sensation: normal    Protective Sensation using Sparks-Yaneth Monofilament:  10     Left Foot Neurologic   Normal sensation    Light touch sensation:  Normal  Vibratory sensation:  Normal  Hot/cold sensation: normal    Protective Sensation using Sparks-Yaneth Monofilament:  10     MUSCULOSKELETAL      Right Foot Musculoskeletal   Ecchymosis:  None  Tenderness: neuroma    Tenderness comment:  Tenderness in second and third interspace  Arch:  Normal  Hallux valgus: No       Left Foot Musculoskeletal   Ecchymosis:  None  Tenderness: none    Arch:  Normal  Hallux valgus: No       MUSCLE STRENGTH     Right Foot Muscle Strength   Foot dorsiflexion:  5  Foot plantar flexion:  5  Foot inversion:  5  Foot eversion:  5     Left Foot Muscle Strength   Foot dorsiflexion:  5  Foot plantar flexion:  5  Foot inversion:  5  Foot eversion:  5     RANGE OF MOTION      Right Foot Range of Motion   Foot and ankle ROM within normal limits       Left Foot Range of Motion   Foot and ankle ROM within normal limits       DERMATOLOGIC     Right Foot Dermatologic    Skin: skin intact and atrophic    Nails: onychomycosis and dystrophic nails    Nails: no ingrown toenail    Nails comment:  Hallux     Left Foot Dermatologic   Skin: skin intact and atrophic    Nails: onychomycosis and dystrophic nails    Nails: no ingrown toenail    Nails comment:  Hallux     Image:       RADIOLOGY/NUCLEAR:  No results found.    LABORATORY/CULTURE RESULTS:      PATHOLOGY RESULTS:       ASSESSMENT/PLAN     Diagnoses and all orders for this visit:    1. Plantar neuroma of right foot (Primary)  -     COVID PRE-OP / PRE-PROCEDURE SCREENING ORDER (NO ISOLATION) - Swab, Nasopharynx; Future  -     Case Request; Standing  -     Instructions on coughing, deep breathing, and incentive spirometry.; Future  -     CBC & Differential; Future  -     Basic Metabolic Panel; Future  -     ECG 12 Lead; Future  -     XR chest 2 vw; Future  -     Case Request    2. Foot pain, right    Other orders  -     Follow Anesthesia Guidelines / Protocol; Future  -     Obtain Informed Consent; Future  -     Provide Instructions to Patient Regarding NPO Status; Future  -     Chlorhexidine Skin Prep - Educate and Review With Patient; Future  -     Provide NPO Instructions to Patient      Comprehensive lower extremity examination and evaluation was performed.  Discussed findings and treatment plan including risks, benefits, and treatment options with patient in detail. Patient agreed with treatment plan.  Reviewed x-rays with patient.   Findings are consistent of plantar neuroma of the right third interspace.  Patient wishes to forego any additional conservative therapy and proceed with surgical intervention.  Patient will be scheduled for a right foot third interspace neuroma excision.  All options, benefits, and risks associate with surgery, discussed with the patient including but not limited to: Standard risk anesthesia, pain, bleeding, infection, nonhealing/dehiscence, stump neuroma, metatarsal fracture, loss of limb or  life.  Pre and postoperative courses were discussed in detail including minimum 1 week nonweightbearing status postoperatively.  No guarantees were inferred.  An After Visit Summary was printed and given to the patient at discharge, including (if requested) any available informative/educational handouts regarding diagnosis, treatment, or medications. All questions were answered to patient/family satisfaction. Should symptoms fail to improve or worsen they agree to call or return to clinic or to go to the Emergency Department. Discussed the importance of following up with any needed screening tests/labs/specialist appointments and any requested follow-up recommended by me today. Importance of maintaining follow-up discussed and patient accepts that missed appointments can delay diagnosis and potentially lead to worsening of conditions.  Return for Post-Op appointment., or sooner if acute issues arise.    Procedures    Lab Frequency Next Occurrence   Follow Anesthesia Guidelines / Standing Orders Once 09/05/2018       This document has been electronically signed by Jani Davis DPM on April 12, 2022 13:01 CDT

## 2022-04-12 NOTE — PROGRESS NOTES
April 12, 2022    Hello, may I speak with Mary Ann Jones?    My name is Maggie Oliver      I am  with [facility] [place] [facility]  [address], [address]  [place] [address] [phone].    Before we get started may I verify your date of birth? 1945    I am calling to officially welcome you to our practice and ask about your recent visit. Is this a good time to talk? yes    Tell me about your visit with us. What things went well?  Everything went well.       We're always looking for ways to make our patients' experiences even better. Do you have recommendations on ways we may improve?  no    Overall were you satisfied with your follow up visit to our practice? Yes, it went great.     I appreciate you taking the time to speak with me today. Is there anything else I can do for you? no      Thank you, and have a great day.

## 2022-04-19 ENCOUNTER — TELEPHONE (OUTPATIENT)
Dept: PODIATRY | Facility: CLINIC | Age: 77
End: 2022-04-19

## 2022-04-20 ENCOUNTER — TELEPHONE (OUTPATIENT)
Dept: PODIATRY | Facility: CLINIC | Age: 77
End: 2022-04-20

## 2022-04-27 PROBLEM — G57.61 PLANTAR NEUROMA OF RIGHT FOOT: Status: ACTIVE | Noted: 2022-04-27

## 2022-05-16 ENCOUNTER — PRE-ADMISSION TESTING (OUTPATIENT)
Dept: PREADMISSION TESTING | Facility: HOSPITAL | Age: 77
End: 2022-05-16

## 2022-05-16 ENCOUNTER — TELEPHONE (OUTPATIENT)
Dept: VASCULAR SURGERY | Facility: CLINIC | Age: 77
End: 2022-05-16

## 2022-05-16 ENCOUNTER — LAB (OUTPATIENT)
Dept: LAB | Facility: HOSPITAL | Age: 77
End: 2022-05-16

## 2022-05-16 ENCOUNTER — HOSPITAL ENCOUNTER (OUTPATIENT)
Dept: GENERAL RADIOLOGY | Facility: HOSPITAL | Age: 77
Discharge: HOME | End: 2022-05-16

## 2022-05-16 VITALS
HEIGHT: 63 IN | HEART RATE: 86 BPM | OXYGEN SATURATION: 98 % | SYSTOLIC BLOOD PRESSURE: 131 MMHG | DIASTOLIC BLOOD PRESSURE: 70 MMHG | WEIGHT: 113.54 LBS | BODY MASS INDEX: 20.12 KG/M2 | RESPIRATION RATE: 16 BRPM

## 2022-05-16 DIAGNOSIS — G57.61 PLANTAR NEUROMA OF RIGHT FOOT: ICD-10-CM

## 2022-05-16 LAB
ANION GAP SERPL CALCULATED.3IONS-SCNC: 8 MMOL/L (ref 5–15)
BASOPHILS # BLD AUTO: 0.03 10*3/MM3 (ref 0–0.2)
BASOPHILS NFR BLD AUTO: 0.5 % (ref 0–1.5)
BUN SERPL-MCNC: 12 MG/DL (ref 8–23)
BUN/CREAT SERPL: 18.8 (ref 7–25)
CALCIUM SPEC-SCNC: 8.8 MG/DL (ref 8.6–10.5)
CHLORIDE SERPL-SCNC: 98 MMOL/L (ref 98–107)
CO2 SERPL-SCNC: 30 MMOL/L (ref 22–29)
CREAT SERPL-MCNC: 0.64 MG/DL (ref 0.57–1)
DEPRECATED RDW RBC AUTO: 42.5 FL (ref 37–54)
EGFRCR SERPLBLD CKD-EPI 2021: 91.2 ML/MIN/1.73
EOSINOPHIL # BLD AUTO: 0.05 10*3/MM3 (ref 0–0.4)
EOSINOPHIL NFR BLD AUTO: 0.9 % (ref 0.3–6.2)
ERYTHROCYTE [DISTWIDTH] IN BLOOD BY AUTOMATED COUNT: 12 % (ref 12.3–15.4)
GLUCOSE SERPL-MCNC: 99 MG/DL (ref 65–99)
HCT VFR BLD AUTO: 40.4 % (ref 34–46.6)
HGB BLD-MCNC: 13 G/DL (ref 12–15.9)
IMM GRANULOCYTES # BLD AUTO: 0.02 10*3/MM3 (ref 0–0.05)
IMM GRANULOCYTES NFR BLD AUTO: 0.3 % (ref 0–0.5)
LYMPHOCYTES # BLD AUTO: 1.56 10*3/MM3 (ref 0.7–3.1)
LYMPHOCYTES NFR BLD AUTO: 27 % (ref 19.6–45.3)
MCH RBC QN AUTO: 31.1 PG (ref 26.6–33)
MCHC RBC AUTO-ENTMCNC: 32.2 G/DL (ref 31.5–35.7)
MCV RBC AUTO: 96.7 FL (ref 79–97)
MONOCYTES # BLD AUTO: 0.46 10*3/MM3 (ref 0.1–0.9)
MONOCYTES NFR BLD AUTO: 8 % (ref 5–12)
NEUTROPHILS NFR BLD AUTO: 3.65 10*3/MM3 (ref 1.7–7)
NEUTROPHILS NFR BLD AUTO: 63.3 % (ref 42.7–76)
NRBC BLD AUTO-RTO: 0 /100 WBC (ref 0–0.2)
PLATELET # BLD AUTO: 270 10*3/MM3 (ref 140–450)
PMV BLD AUTO: 8.7 FL (ref 6–12)
POTASSIUM SERPL-SCNC: 4.1 MMOL/L (ref 3.5–5.2)
RBC # BLD AUTO: 4.18 10*6/MM3 (ref 3.77–5.28)
SARS-COV-2 ORF1AB RESP QL NAA+PROBE: NOT DETECTED
SODIUM SERPL-SCNC: 136 MMOL/L (ref 136–145)
WBC NRBC COR # BLD: 5.77 10*3/MM3 (ref 3.4–10.8)

## 2022-05-16 PROCEDURE — 36415 COLL VENOUS BLD VENIPUNCTURE: CPT

## 2022-05-16 PROCEDURE — 93010 ELECTROCARDIOGRAM REPORT: CPT | Performed by: INTERNAL MEDICINE

## 2022-05-16 PROCEDURE — 80048 BASIC METABOLIC PNL TOTAL CA: CPT

## 2022-05-16 PROCEDURE — 85025 COMPLETE CBC W/AUTO DIFF WBC: CPT

## 2022-05-16 PROCEDURE — 71046 X-RAY EXAM CHEST 2 VIEWS: CPT

## 2022-05-16 PROCEDURE — U0004 COV-19 TEST NON-CDC HGH THRU: HCPCS

## 2022-05-16 PROCEDURE — C9803 HOPD COVID-19 SPEC COLLECT: HCPCS

## 2022-05-16 PROCEDURE — 93005 ELECTROCARDIOGRAM TRACING: CPT

## 2022-05-16 RX ORDER — DICLOFENAC SODIUM 75 MG/1
TABLET, DELAYED RELEASE ORAL
COMMUNITY
Start: 2022-03-05

## 2022-05-16 RX ORDER — PANTOPRAZOLE SODIUM 40 MG/1
TABLET, DELAYED RELEASE ORAL
COMMUNITY
Start: 2022-03-05

## 2022-05-16 NOTE — DISCHARGE INSTRUCTIONS
Before you come to the hospital        Arrival time: AS DIRECTED BY OFFICE     YOU MAY TAKE THE FOLLOWING MEDICATION(S) THE MORNING OF SURGERY WITH A SIP OF WATER: NONE           ALL OTHER HOME MEDICATION CHECK WITH YOUR PHYSICIAN (especially if you are taking diabetes medicines or blood thinners)    Do not take any Erectile Dysfunction medications (EX: CIALIS, VIAGRA) 24 hours prior to surgery      If you were given and instructed to use a germ- killing soap, use as directed the night before surgery and the morning of surgery before coming to the hospital.             Eating and drinking restrictions prior to scheduled arrival time    2 Hours before arrival time STOP   Drinking Clear liquids (water, apple juice-no pulp)     6 Hours before arrival time STOP   Milk or drinks that contain milk, full liquids    6 Hours before arrival time STOP   Light meals or foods, such as toast or cereal    8 Hours before arrival time STOP   Heavy foods, such as meat, fried foods, or fatty foods    (It is extremely important that you follow these guidelines to prevent delay or cancelation of your procedure)     Clear Liquids  Water and flavored water                                                                      Clear Fruit juices, such as cranberry juice and apple juice.  Black coffee (NO cream of any kind, including powdered).  Plain tea  Clear bouillon or broth.  Flavored gelatin.  Soda.  Gatorade or Powerade.  Full liquid examples  Juices that have pulp.  Frozen ice pops that contain fruit pieces.  Coffee with creamer  Milk.  Yogurt.              MANAGING PAIN AFTER SURGERY    We know you are probably wondering what your pain will be like after surgery.  Following surgery it is unrealistic to expect you will not have pain.   Pain is how our bodies let us know that something is wrong or cautions us to be careful.  That said, our goal is to make your pain tolerable.    Methods we may use to treat your pain include (oral or  IV medications, PCAs, epidurals, nerve blocks, etc.)   While some procedures require IV pain medications for a short time after surgery, transitioning to pain medications by mouth allows for better management of pain.   Your nurse will encourage you to take oral pain medications whenever possible.  IV medications work almost immediately, but only last a short while.  Taking medications by mouth allows for a more constant level of medication in your blood stream for a longer period of time.      Once your pain is out of control it is harder to get back under control.  It is important you are aware when your next dose of pain medication is due.  If you are admitted, your nurse may write the time of your next dose on the white board in your room to help you remember.      We are interested in your pain and encourage you to inform us about aggravating factors during your visit.   Many times a simple repositioning every few hours can make a big difference.    If your physician says it is okay, do not let your pain prevent you from getting out of bed. Be sure to call your nurse for assistance prior to getting up so you do not fall.      Before surgery, please decide your tolerable pain goal.  These faces help describe the pain ratings we use on a 0-10 scale.   Be prepared to tell us your goal and whether or not you take pain or anxiety medications at home.

## 2022-05-16 NOTE — TELEPHONE ENCOUNTER
Called the patient to let her know that her surgery arrival time has moved to 6:00 AM.  She was reminded to be NPO after midnight the night before his surgery.

## 2022-05-17 ENCOUNTER — TELEPHONE (OUTPATIENT)
Dept: VASCULAR SURGERY | Facility: CLINIC | Age: 77
End: 2022-05-17

## 2022-05-17 LAB
QT INTERVAL: 358 MS
QTC INTERVAL: 410 MS

## 2022-05-17 NOTE — TELEPHONE ENCOUNTER
Due to 2 patients having to cancel their surgeries tomorrow, I called the patient to let her know that her arrival time for surgery has changed to 5:30 AM.  She said that wasn't a problem and she would be there.

## 2022-05-18 ENCOUNTER — HOSPITAL ENCOUNTER (OUTPATIENT)
Facility: HOSPITAL | Age: 77
Setting detail: HOSPITAL OUTPATIENT SURGERY
Discharge: HOME | End: 2022-05-18
Attending: PODIATRIST | Admitting: PODIATRIST

## 2022-05-18 ENCOUNTER — ANESTHESIA EVENT (OUTPATIENT)
Dept: PERIOP | Facility: HOSPITAL | Age: 77
End: 2022-05-18

## 2022-05-18 ENCOUNTER — ANESTHESIA (OUTPATIENT)
Dept: PERIOP | Facility: HOSPITAL | Age: 77
End: 2022-05-18

## 2022-05-18 VITALS
RESPIRATION RATE: 16 BRPM | HEART RATE: 81 BPM | TEMPERATURE: 98.1 F | OXYGEN SATURATION: 100 % | SYSTOLIC BLOOD PRESSURE: 161 MMHG | DIASTOLIC BLOOD PRESSURE: 75 MMHG

## 2022-05-18 DIAGNOSIS — G57.61 PLANTAR NEUROMA OF RIGHT FOOT: ICD-10-CM

## 2022-05-18 PROCEDURE — 25010000002 EPINEPHRINE 1 MG/ML SOLUTION 1 ML AMPULE: Performed by: PODIATRIST

## 2022-05-18 PROCEDURE — 28080 REMOVAL OF FOOT LESION: CPT | Performed by: NURSE PRACTITIONER

## 2022-05-18 PROCEDURE — 88304 TISSUE EXAM BY PATHOLOGIST: CPT | Performed by: PODIATRIST

## 2022-05-18 PROCEDURE — 25010000002 DEXAMETHASONE PER 1 MG: Performed by: ANESTHESIOLOGY

## 2022-05-18 PROCEDURE — 25010000002 FENTANYL CITRATE (PF) 100 MCG/2ML SOLUTION: Performed by: NURSE ANESTHETIST, CERTIFIED REGISTERED

## 2022-05-18 PROCEDURE — 25010000002 CEFAZOLIN PER 500 MG: Performed by: PODIATRIST

## 2022-05-18 PROCEDURE — S0260 H&P FOR SURGERY: HCPCS | Performed by: PODIATRIST

## 2022-05-18 PROCEDURE — 25010000002 PROPOFOL 10 MG/ML EMULSION: Performed by: NURSE ANESTHETIST, CERTIFIED REGISTERED

## 2022-05-18 PROCEDURE — 28080 REMOVAL OF FOOT LESION: CPT | Performed by: PODIATRIST

## 2022-05-18 PROCEDURE — 25010000002 DEXAMETHASONE PER 1 MG: Performed by: PODIATRIST

## 2022-05-18 RX ORDER — SODIUM CHLORIDE 0.9 % (FLUSH) 0.9 %
3 SYRINGE (ML) INJECTION AS NEEDED
Status: DISCONTINUED | OUTPATIENT
Start: 2022-05-18 | End: 2022-05-18 | Stop reason: HOSPADM

## 2022-05-18 RX ORDER — LABETALOL HYDROCHLORIDE 5 MG/ML
5 INJECTION, SOLUTION INTRAVENOUS
Status: DISCONTINUED | OUTPATIENT
Start: 2022-05-18 | End: 2022-05-18 | Stop reason: HOSPADM

## 2022-05-18 RX ORDER — DOCUSATE SODIUM 100 MG/1
100 CAPSULE, LIQUID FILLED ORAL DAILY
Qty: 7 CAPSULE | Refills: 0 | Status: SHIPPED | OUTPATIENT
Start: 2022-05-18 | End: 2022-05-25

## 2022-05-18 RX ORDER — LIDOCAINE HYDROCHLORIDE 10 MG/ML
0.5 INJECTION, SOLUTION EPIDURAL; INFILTRATION; INTRACAUDAL; PERINEURAL ONCE AS NEEDED
Status: DISCONTINUED | OUTPATIENT
Start: 2022-05-18 | End: 2022-05-18 | Stop reason: HOSPADM

## 2022-05-18 RX ORDER — IBUPROFEN 600 MG/1
600 TABLET ORAL ONCE AS NEEDED
Status: DISCONTINUED | OUTPATIENT
Start: 2022-05-18 | End: 2022-05-18 | Stop reason: HOSPADM

## 2022-05-18 RX ORDER — SODIUM CHLORIDE 0.9 % (FLUSH) 0.9 %
10 SYRINGE (ML) INJECTION AS NEEDED
Status: DISCONTINUED | OUTPATIENT
Start: 2022-05-18 | End: 2022-05-18 | Stop reason: HOSPADM

## 2022-05-18 RX ORDER — DROPERIDOL 2.5 MG/ML
0.62 INJECTION, SOLUTION INTRAMUSCULAR; INTRAVENOUS ONCE AS NEEDED
Status: DISCONTINUED | OUTPATIENT
Start: 2022-05-18 | End: 2022-05-18 | Stop reason: HOSPADM

## 2022-05-18 RX ORDER — DEXAMETHASONE SODIUM PHOSPHATE 4 MG/ML
INJECTION, SOLUTION INTRA-ARTICULAR; INTRALESIONAL; INTRAMUSCULAR; INTRAVENOUS; SOFT TISSUE AS NEEDED
Status: DISCONTINUED | OUTPATIENT
Start: 2022-05-18 | End: 2022-05-18 | Stop reason: HOSPADM

## 2022-05-18 RX ORDER — BUPIVACAINE HYDROCHLORIDE AND EPINEPHRINE 5; .0091 MG/ML; MG/ML
INJECTION, SOLUTION DENTAL; INFILTRATION AS NEEDED
Status: DISCONTINUED | OUTPATIENT
Start: 2022-05-18 | End: 2022-05-18 | Stop reason: HOSPADM

## 2022-05-18 RX ORDER — BUPIVACAINE HCL/0.9 % NACL/PF 0.1 %
2 PLASTIC BAG, INJECTION (ML) EPIDURAL ONCE
Status: DISCONTINUED | OUTPATIENT
Start: 2022-05-18 | End: 2022-05-18 | Stop reason: SDUPTHER

## 2022-05-18 RX ORDER — SODIUM CHLORIDE, SODIUM LACTATE, POTASSIUM CHLORIDE, CALCIUM CHLORIDE 600; 310; 30; 20 MG/100ML; MG/100ML; MG/100ML; MG/100ML
9 INJECTION, SOLUTION INTRAVENOUS CONTINUOUS
Status: DISCONTINUED | OUTPATIENT
Start: 2022-05-18 | End: 2022-05-18 | Stop reason: HOSPADM

## 2022-05-18 RX ORDER — FENTANYL CITRATE 50 UG/ML
25 INJECTION, SOLUTION INTRAMUSCULAR; INTRAVENOUS
Status: DISCONTINUED | OUTPATIENT
Start: 2022-05-18 | End: 2022-05-18 | Stop reason: HOSPADM

## 2022-05-18 RX ORDER — MAGNESIUM HYDROXIDE 1200 MG/15ML
LIQUID ORAL AS NEEDED
Status: DISCONTINUED | OUTPATIENT
Start: 2022-05-18 | End: 2022-05-18 | Stop reason: HOSPADM

## 2022-05-18 RX ORDER — SCOLOPAMINE TRANSDERMAL SYSTEM 1 MG/1
1 PATCH, EXTENDED RELEASE TRANSDERMAL ONCE
Status: DISCONTINUED | OUTPATIENT
Start: 2022-05-18 | End: 2022-05-18 | Stop reason: HOSPADM

## 2022-05-18 RX ORDER — OXYCODONE AND ACETAMINOPHEN 7.5; 325 MG/1; MG/1
1 TABLET ORAL EVERY 6 HOURS PRN
Qty: 28 TABLET | Refills: 0 | Status: SHIPPED | OUTPATIENT
Start: 2022-05-18

## 2022-05-18 RX ORDER — LIDOCAINE HYDROCHLORIDE 20 MG/ML
INJECTION, SOLUTION EPIDURAL; INFILTRATION; INTRACAUDAL; PERINEURAL AS NEEDED
Status: DISCONTINUED | OUTPATIENT
Start: 2022-05-18 | End: 2022-05-18 | Stop reason: SURG

## 2022-05-18 RX ORDER — FENTANYL CITRATE 50 UG/ML
INJECTION, SOLUTION INTRAMUSCULAR; INTRAVENOUS AS NEEDED
Status: DISCONTINUED | OUTPATIENT
Start: 2022-05-18 | End: 2022-05-18 | Stop reason: SURG

## 2022-05-18 RX ORDER — FLUMAZENIL 0.1 MG/ML
0.2 INJECTION INTRAVENOUS AS NEEDED
Status: DISCONTINUED | OUTPATIENT
Start: 2022-05-18 | End: 2022-05-18 | Stop reason: HOSPADM

## 2022-05-18 RX ORDER — SODIUM CHLORIDE, SODIUM LACTATE, POTASSIUM CHLORIDE, CALCIUM CHLORIDE 600; 310; 30; 20 MG/100ML; MG/100ML; MG/100ML; MG/100ML
1000 INJECTION, SOLUTION INTRAVENOUS CONTINUOUS
Status: DISCONTINUED | OUTPATIENT
Start: 2022-05-18 | End: 2022-05-18 | Stop reason: HOSPADM

## 2022-05-18 RX ORDER — OXYCODONE AND ACETAMINOPHEN 7.5; 325 MG/1; MG/1
2 TABLET ORAL EVERY 4 HOURS PRN
Status: DISCONTINUED | OUTPATIENT
Start: 2022-05-18 | End: 2022-05-18 | Stop reason: HOSPADM

## 2022-05-18 RX ORDER — NALOXONE HCL 0.4 MG/ML
0.4 VIAL (ML) INJECTION AS NEEDED
Status: DISCONTINUED | OUTPATIENT
Start: 2022-05-18 | End: 2022-05-18 | Stop reason: HOSPADM

## 2022-05-18 RX ORDER — DEXTROSE MONOHYDRATE 25 G/50ML
12.5 INJECTION, SOLUTION INTRAVENOUS AS NEEDED
Status: DISCONTINUED | OUTPATIENT
Start: 2022-05-18 | End: 2022-05-18 | Stop reason: HOSPADM

## 2022-05-18 RX ORDER — DEXAMETHASONE SODIUM PHOSPHATE 4 MG/ML
4 INJECTION, SOLUTION INTRA-ARTICULAR; INTRALESIONAL; INTRAMUSCULAR; INTRAVENOUS; SOFT TISSUE ONCE AS NEEDED
Status: COMPLETED | OUTPATIENT
Start: 2022-05-18 | End: 2022-05-18

## 2022-05-18 RX ORDER — OXYCODONE AND ACETAMINOPHEN 10; 325 MG/1; MG/1
1 TABLET ORAL ONCE AS NEEDED
Status: DISCONTINUED | OUTPATIENT
Start: 2022-05-18 | End: 2022-05-18 | Stop reason: HOSPADM

## 2022-05-18 RX ORDER — PROMETHAZINE HYDROCHLORIDE 12.5 MG/1
12.5 TABLET ORAL EVERY 8 HOURS PRN
Qty: 21 TABLET | Refills: 0 | Status: SHIPPED | OUTPATIENT
Start: 2022-05-18 | End: 2022-05-25

## 2022-05-18 RX ORDER — HYDROCODONE BITARTRATE AND ACETAMINOPHEN 5; 325 MG/1; MG/1
1 TABLET ORAL EVERY 6 HOURS PRN
COMMUNITY

## 2022-05-18 RX ORDER — PROPOFOL 10 MG/ML
VIAL (ML) INTRAVENOUS AS NEEDED
Status: DISCONTINUED | OUTPATIENT
Start: 2022-05-18 | End: 2022-05-18 | Stop reason: SURG

## 2022-05-18 RX ORDER — SODIUM CHLORIDE 0.9 % (FLUSH) 0.9 %
10 SYRINGE (ML) INJECTION EVERY 12 HOURS SCHEDULED
Status: DISCONTINUED | OUTPATIENT
Start: 2022-05-18 | End: 2022-05-18 | Stop reason: HOSPADM

## 2022-05-18 RX ORDER — BUPIVACAINE HYDROCHLORIDE 5 MG/ML
INJECTION, SOLUTION PERINEURAL AS NEEDED
Status: DISCONTINUED | OUTPATIENT
Start: 2022-05-18 | End: 2022-05-18 | Stop reason: HOSPADM

## 2022-05-18 RX ORDER — ONDANSETRON 2 MG/ML
4 INJECTION INTRAMUSCULAR; INTRAVENOUS ONCE AS NEEDED
Status: DISCONTINUED | OUTPATIENT
Start: 2022-05-18 | End: 2022-05-18 | Stop reason: HOSPADM

## 2022-05-18 RX ADMIN — SODIUM CHLORIDE, POTASSIUM CHLORIDE, SODIUM LACTATE AND CALCIUM CHLORIDE 1000 ML: 600; 310; 30; 20 INJECTION, SOLUTION INTRAVENOUS at 06:28

## 2022-05-18 RX ADMIN — Medication 2 G: at 07:30

## 2022-05-18 RX ADMIN — LIDOCAINE HYDROCHLORIDE 100 MG: 20 INJECTION, SOLUTION EPIDURAL; INFILTRATION; INTRACAUDAL; PERINEURAL at 07:32

## 2022-05-18 RX ADMIN — DEXAMETHASONE SODIUM PHOSPHATE 4 MG: 4 INJECTION, SOLUTION INTRAMUSCULAR; INTRAVENOUS at 07:20

## 2022-05-18 RX ADMIN — FENTANYL CITRATE 100 MCG: 50 INJECTION, SOLUTION INTRAMUSCULAR; INTRAVENOUS at 07:32

## 2022-05-18 RX ADMIN — SCOPALAMINE 1 PATCH: 1 PATCH, EXTENDED RELEASE TRANSDERMAL at 07:20

## 2022-05-18 RX ADMIN — PROPOFOL 200 MG: 10 INJECTION, EMULSION INTRAVENOUS at 07:32

## 2022-05-18 NOTE — H&P
[facility] - PODIATRY    Today's Date: 05/18/22    Patient Name: Mary Ann Jones  MRN: [id number]  CSN: [id number]  PCP: Marcos Hernandez APRN  Referring Provider: Jani Davis DPM    SUBJECTIVE     No chief complaint on file.    HPI: Mary Ann Jones, a 76 y.o.female, comes to clinic as a(n) established patient for follow-up treatment of Plantar neuroma. Patient has h/o Vitas, chronic back pain, depression, GERD, hypertension, hypothyroidism. Patient presents for re-evaluation and possible surgical discussion of right foot plantar neuroma. Has had injections previously which did provide relief of symptoms for a period of time. Has also been wearing metatarsal cushion since last visit which she notes does help with pain. Pain is most prevalent with walking.  She is interested in surgical excision to improve pain. Admits pain at 8/10 level and described as shooting, aching and sharp. Relates previous treatment(s) including Neuroma injection, metatarsal cushion. Denies any constitutional symptoms. No other pedal complaints at this time.    Past Medical History:   Diagnosis Date   • Allergic rhinitis    • Arthritis    • Chronic back pain    • Chronic constipation    • Depression    • GERD (gastroesophageal reflux disease)    • Hypertension     mild    • Hypothyroidism    • Insomnia    • Macular degeneration    • Melanoma (HCC)    • Muscle spasms of both lower extremities     and back    • Neuroma of foot     right   • PONV (postoperative nausea and vomiting)    • Thyroid cancer (HCC)      Past Surgical History:   Procedure Laterality Date   • ANTERIOR AND POSTERIOR VAGINAL REPAIR N/A 9/17/2018    Procedure: ANTERIOR AND POSTERIOR VAGINAL REPAIR;  Surgeon: Margaux Pearson MD;  Location: [facility];  Service: Obstetrics/Gynecology   • APPENDECTOMY     • BREAST RECONSTRUCTION     • BREAST SURGERY      Ede mastectomy   • CARDIAC CATHETERIZATION     • CHOLECYSTECTOMY     • COLONOSCOPY     •  ESOPHAGEAL DILATATION     • HYSTERECTOMY     • MID-URETHRAL SLING WITH CYSTOSCOPY N/A 2018    Procedure: MID-URETHRAL SLING WITH CYSTOSCOPY;  Surgeon: Margaux Pearson MD;  Location: [facility] OR;  Service: Obstetrics/Gynecology   • SKIN CANCER EXCISION     • TOTAL THYROIDECTOMY       Family History   Problem Relation Age of Onset   • Cancer Mother         breast   • Alzheimer's disease Father    • Heart attack Neg Hx    • Heart disease Neg Hx    • Heart failure Neg Hx      Social History     Socioeconomic History   • Marital status:    Tobacco Use   • Smoking status: Former Smoker     Years: 30.00     Types: Cigarettes     Quit date:      Years since quittin.4   • Smokeless tobacco: Never Used   Vaping Use   • Vaping Use: Never used   Substance and Sexual Activity   • Alcohol use: Yes     Comment: socially   • Drug use: No   • Sexual activity: Defer     No Known Allergies  Current Facility-Administered Medications   Medication Dose Route Frequency Provider Last Rate Last Admin   • ceFAZolin (ANCEF) in SWFI 2 g/20ml IV PUSH syringe  2 g Intravenous Once Jani Davis DPM       • dextrose (D50W) (25 g/50 mL) IV injection 12.5 g  12.5 g Intravenous PRN Martín Escudero MD       • fentaNYL citrate (PF) (SUBLIMAZE) injection 25 mcg  25 mcg Intravenous Q5 Min PRN Martín Escudero MD       • lactated ringers infusion 1,000 mL  1,000 mL Intravenous Continuous Jani Davis DPM 25 mL/hr at 22 0628 1,000 mL at 22 0628   • lactated ringers infusion  9 mL/hr Intravenous Continuous Martín Escudero MD       • lidocaine PF 1% (XYLOCAINE) injection 0.5 mL  0.5 mL Intradermal Once PRN Jani Davis DPM       • lidocaine PF 1% (XYLOCAINE) injection 0.5 mL  0.5 mL Injection Once PRN Martín Escudero MD       • scopolamine patch 1 mg/72 hr  1 patch Transdermal Once Martín Escudero MD   1 patch at 22 0720   • sodium chloride 0.9 %  flush 10 mL  10 mL Intravenous Q12H Martín Escudero MD       • sodium chloride 0.9 % flush 10 mL  10 mL Intravenous PRN Martín Escudero MD       • sodium chloride 0.9 % flush 3 mL  3 mL Intravenous PRN Jani Davis DPM         Review of Systems   Constitutional: Negative for chills and fever.   HENT: Negative for congestion.    Respiratory: Negative for shortness of breath.    Cardiovascular: Negative for chest pain and leg swelling.   Gastrointestinal: Negative for constipation, diarrhea, nausea and vomiting.   Musculoskeletal: Positive for arthralgias. Negative for myalgias.        Right foot pain   Skin: Negative for wound.   Neurological: Negative for numbness.       OBJECTIVE     Vitals:    05/18/22 0715   BP:    Pulse: 70   Resp: 17   Temp:    SpO2: 94%       PHYSICAL EXAM  GEN:   Accompanied by none.     Physical Exam  Vitals reviewed.   Constitutional:       Appearance: Normal appearance. She is well-developed.   HENT:      Head: Normocephalic and atraumatic.      Right Ear: Tympanic membrane normal.      Left Ear: Tympanic membrane normal.      Nose: Nose normal.      Mouth/Throat:      Pharynx: Oropharynx is clear.   Eyes:      Extraocular Movements: Extraocular movements intact.      Pupils: Pupils are equal, round, and reactive to light.   Cardiovascular:      Rate and Rhythm: Normal rate and regular rhythm.      Pulses: Normal pulses.           Dorsalis pedis pulses are 2+ on the right side and 2+ on the left side.        Posterior tibial pulses are 2+ on the right side and 2+ on the left side.      Heart sounds: Normal heart sounds.   Pulmonary:      Effort: Pulmonary effort is normal.      Breath sounds: Normal breath sounds.   Abdominal:      General: Bowel sounds are normal.      Palpations: Abdomen is soft.   Musculoskeletal:      Cervical back: Normal range of motion and neck supple.      Right foot: No bunion.      Left foot: No bunion.   Feet:      Right foot:       Protective Sensation: 10 sites sensed.      Skin integrity: Warmth present.      Toenail Condition: ingrown     Left foot:      Protective Sensation: 10 sites sensed.      Skin integrity: Warmth present.      Toenail Condition: ingrown  Neurological:      General: No focal deficit present.      Mental Status: She is alert and oriented to person, place, and time. Mental status is at baseline.   Psychiatric:         Mood and Affect: Mood normal.         Behavior: Behavior normal.         Thought Content: Thought content normal.         Judgment: Judgment normal.         Foot/Ankle Exam:       General:   Appearance: appears stated age and healthy    Orientation: AAOx3    Affect: appropriate    Gait: unimpaired    Assistance: independent    Shoe Gear:  Casual shoes    VASCULAR      Right Foot Vascularity   Dorsalis pedis:  2+  Posterior tibial:  2+  Skin Temperature: warm    Edema Grading:  None  CFT:  3  Pedal Hair Growth:  Present  Varicosities: moderate varicosities       Left Foot Vascularity   Dorsalis pedis:  2+  Posterior tibial:  2+  Skin Temperature: warm    Edema Grading:  None  CFT:  3  Pedal Hair Growth:  Present  Varicosities: moderate varicosities        NEUROLOGIC     Right Foot Neurologic   Normal sensation    Light touch sensation:  Normal  Vibratory sensation:  Normal  Hot/Cold sensation: normal    Protective Sensation using [place] Monofilament:  10     Left Foot Neurologic   Normal sensation    Light touch sensation:  Normal  Vibratory sensation:  Normal  Hot/cold sensation: normal    Protective Sensation using [place] Monofilament:  10     MUSCULOSKELETAL      Right Foot Musculoskeletal   Ecchymosis:  None  Tenderness: neuroma    Tenderness comment:  Tenderness in second and third interspace  Arch:  Normal  Hallux valgus: No       Left Foot Musculoskeletal   Ecchymosis:  None  Tenderness: none    Arch:  Normal  Hallux valgus: No       MUSCLE STRENGTH     Right Foot Muscle Strength    Foot dorsiflexion:  5  Foot plantar flexion:  5  Foot inversion:  5  Foot eversion:  5     Left Foot Muscle Strength   Foot dorsiflexion:  5  Foot plantar flexion:  5  Foot inversion:  5  Foot eversion:  5     RANGE OF MOTION      Right Foot Range of Motion   Foot and ankle ROM within normal limits       Left Foot Range of Motion   Foot and ankle ROM within normal limits       DERMATOLOGIC     Right Foot Dermatologic   Skin: skin intact and atrophic    Nails: onychomycosis and dystrophic nails    Nails: no ingrown toenail    Nails comment:  Hallux     Left Foot Dermatologic   Skin: skin intact and atrophic    Nails: onychomycosis and dystrophic nails    Nails: no ingrown toenail    Nails comment:  Hallux     Image:       RADIOLOGY/NUCLEAR:  XR Chest 2 View    Result Date: 5/16/2022  Narrative: EXAMINATION: XR CHEST 2 VW- 5/16/2022 12:08 PM CDT  HISTORY: pre op; G57.61-Lesion of plantar nerve, right lower limb.  REPORT: Frontal and lateral views of the chest were obtained.  COMPARISON: Chest x-ray 7/12/2016.  The lungs are mildly hyperinflated without infiltrates. Heart size is normal. No pneumothorax or pleural effusion is identified. There is S-shaped thoracolumbar scoliosis as before. No acute osseous abnormality.      Impression: No acute cardiopulmonary abnormality. This report was finalized on 05/16/2022 12:11 by Dr. Jad Pro MD.      LABORATORY/CULTURE RESULTS:      PATHOLOGY RESULTS:       ASSESSMENT/PLAN     Diagnoses and all orders for this visit:    1. Plantar neuroma of right foot  -     Discontinue: ceFAZolin in 0.9% normal saline (ANCEF) IVPB solution 2 g    Other orders  -     Follow Anesthesia Guidelines / Protocol; Standing  -     Verify NPO Status; Standing  -     Obtain Informed Consent (If Not Done Inpatient or PAT); Standing  -     Instructions on coughing, deep breathing, and incentive spirometry.; Standing  -     Notify Physician - Standard; Standing  -     Follow Anesthesia Guidelines  / Protocol  -     Verify NPO Status  -     Obtain Informed Consent (If Not Done Inpatient or PAT)  -     Instructions on coughing, deep breathing, and incentive spirometry.  -     Instructions on coughing, deep breathing, and incentive spirometry.  -     Instructions on coughing, deep breathing, and incentive spirometry.  -     Instructions on coughing, deep breathing, and incentive spirometry.  -     Instructions on coughing, deep breathing, and incentive spirometry.  -     Notify Physician - Standard  -     ceFAZolin (ANCEF) in SWFI 2 g/20ml IV PUSH syringe  -     Initiate Anesthesia Protocol; Standing  -     Insert Peripheral IV; Standing  -     lidocaine PF 1% (XYLOCAINE) injection 0.5 mL  -     Cancel: Maintain IV Access; Standing  -     sodium chloride 0.9 % flush 3 mL  -     lactated ringers infusion 1,000 mL  -     Initiate Anesthesia Protocol  -     Insert Peripheral IV  -     Cancel: Maintain IV Access  -     Vital Signs - Per Anesthesia Protocol; Standing  -     Oxygen Therapy- Nasal Cannula; Titrate for SPO2: equal to or greater than, 90%; Standing  -     Pulse Oximetry, Continuous; Standing  -     Insert Peripheral IV; Standing  -     Saline Lock & Maintain IV Access; Standing  -     sodium chloride 0.9 % flush 10 mL  -     sodium chloride 0.9 % flush 10 mL  -     lidocaine PF 1% (XYLOCAINE) injection 0.5 mL  -     lactated ringers infusion  -     fentaNYL citrate (PF) (SUBLIMAZE) injection 25 mcg  -     dextrose (D50W) (25 g/50 mL) IV injection 12.5 g  -     dexamethasone (DECADRON) injection 4 mg  -     scopolamine patch 1 mg/72 hr  -     Oxygen Therapy- Nasal Cannula; Titrate for SPO2: equal to or greater than, 90%  -     Pulse Oximetry, Continuous  -     Insert Peripheral IV  -     Saline Lock & Maintain IV Access      Comprehensive lower extremity examination and evaluation was performed.  Discussed findings and treatment plan including risks, benefits, and treatment options with patient in detail.  Patient agreed with treatment plan.  Reviewed x-rays with patient.   Findings are consistent of plantar neuroma of the right third interspace.  Patient wishes to forego any additional conservative therapy and proceed with surgical intervention.  Patient will be scheduled for a right foot third interspace neuroma excision.  All options, benefits, and risks associate with surgery, discussed with the patient including but not limited to: Standard risk anesthesia, pain, bleeding, infection, nonhealing/dehiscence, stump neuroma, metatarsal fracture, loss of limb or life.  Pre and postoperative courses were discussed in detail including minimum 1 week nonweightbearing status postoperatively.  No guarantees were inferred.  An After Visit Summary was printed and given to the patient at discharge, including (if requested) any available informative/educational handouts regarding diagnosis, treatment, or medications. All questions were answered to patient/family satisfaction. Should symptoms fail to improve or worsen they agree to call or return to clinic or to go to the Emergency Department. Discussed the importance of following up with any needed screening tests/labs/specialist appointments and any requested follow-up recommended by me today. Importance of maintaining follow-up discussed and patient accepts that missed appointments can delay diagnosis and potentially lead to worsening of conditions.  No follow-ups on file., or sooner if acute issues arise.    Procedures    Lab Frequency Next Occurrence   Follow Anesthesia Guidelines / Standing Orders Once 09/05/2018       This document has been electronically signed by Jani Davis DPM on May 18, 2022 07:21 CDT

## 2022-05-18 NOTE — OP NOTE
POSTOPERATIVE NOTE  Patient: Mary Ann Jones  MRN: [id number]    YOB: 1945  Age: 76 y.o.  Sex: female  Unit:  [facility] OR Room/Bed: PAD OR/MAIN OR Location: [facility]    Date of Operation: 5/18/2022     Preoperative Diagnosis:  Plantar neuroma of right foot [G57.61]     Postoperative Diagnosis:  1. Same as pre-operative    Operative Procedures:  1. EXCISION OF NEUROMA 3RD INTERSPACE - FOOT RIGHT    Surgeon: Surgeon(s) and Role:     * Jani Davis DPM - Primary    Assistant: ARGELIA Chopra was responsible for performing the following activities: Retraction, Suction, Irrigation and Placing Dressing and their skilled assistance was necessary for the success of this case.     Anesthesia: General     Hemostasis: Anatomic Dissection, Calf Tourniquet, Electric cauterization    Estimated Blood Loss: minimal    Pathology:   Specimens     ID Source Type Tests Collected By Collected At Frozen?    A Foot, Right Tissue · TISSUE PATHOLOGY EXAM   Jani Davis DPM 5/18/22 0804     Description: NEUROMA RIGHT FOOT          Materials: Nothing was implanted during the procedure    Injectables: 10mL 0.5% Marcaine Plain    Fluids: See anesthesia log.    Drains: None    Complications: None    Postoperative Condition: Stable. Patient tolerated procedure and anesthesia well. Patient left the operating room with vital signs stable and vascular status intact.     Operative Findings: Consistent with preoperative diagnosis.    Indications for Procedure: This 76 y.o. patient presents with painful neuroma of the right foot.  Patient states that they have failed conservative therapy and opts for surgical correction at this time. The patient has been NPO for greater than 8 hours. The patient is ready for surgical intervention.    DESCRIPTION OF PROCEDURE  Under mild sedation, patient was brought into the operating room and placed on the operating room table in supine position. Preoperative antibiotic was given. A  pneumatic calf tourniquet was placed about the patient's right calf. The patient was placed under General anesthesia, then local block was performed at the surgical site using the above mentioned local anesthesia. The foot and ankle were then scrubbed, prepped and draped in the usual aseptic manner. Using an Esmarch, the foot and ankle were exsanguinated and tourniquet was inflated.    Attention was then directed to the dorsal distal aspect of the third interspace of the right foot where a 4 cm linear longitudinal incision was made.  Incision was deepened through the subcutaneous tissues using sharp and blunt dissection.  Care was taken to identify and retract all vital neural and vascular structures.  All bleeders were ligated and cauterized as necessary.  Incision was deepened to the level of the deep transverse intermetatarsal ligament which was noted to be tight.  The ligament was then released.  Deep to the ligament, the plantar neuroma was identified and noted to be moderate enlarged at the bifurcation.  It was then freed from surrounding tissues.  The nerve was then resected as far proximal and distal as possible within the operative site.  It will be sent to pathology for analysis.  The wound was inspected for any additional pathologic tissue and none was found.  The wound is flushed with copious amounts of sterile saline.  Subcutaneous tissues were then reapproximated utilizing 4-0 Vicryl.  Skin was reapproximated utilizing 4-0 Nylon in horizontal mattress suturing technique.  1 cc of dexamethasone was injected into the operative site.       The tourniquet was deflated and capillary refill was noted to all toes.  Hemostasis again obtained.     A bandage consisting of Adaptic, 4 x 4's, Kerlix, and Coban was applied.     The patient tolerated procedure and anesthesia well.  She was transferred to the recovery room with vital signs stable and vascular status intact to the right lower extremity.  After period  of postoperative monitoring, the patient will be discharged home with oral and written postop instructions.  She will follow-up in office within 1 week.  She is to be nonweightbearing to the surgical foot.

## 2022-05-18 NOTE — BRIEF OP NOTE
[facility] NE[facility] EXCISION  Progress Note    Mar yAnn Jones  5/18/2022    Pre-op Diagnosis:   Plantar neuroma of right foot [G57.61]       Post-Op Diagnosis Codes:     * Plantar neuroma of right foot [G57.61]    Procedure/CPT® Codes:        Procedure(s):  1. EXCISION OF NEUROMA 3RD INTERSPACE - FOOT RIGHT    Surgeon(s):  Jani Davis DPM    Anesthesia: General with Block    Staff:   Circulator: Marlin Mcmahan RN  Scrub Person: Marlin Shetty; Gregg Cook         Estimated Blood Loss: minimal    Urine Voided: * No values recorded between 5/18/2022  7:30 AM and 5/18/2022  8:15 AM *    Specimens:                Specimens     ID Source Type Tests Collected By Collected At Frozen?    A Foot, Right Tissue · TISSUE PATHOLOGY EXAM   Jani Davis DPM 5/18/22 0804     Description: NEUROMA RIGHT FOOT                Drains: * No LDAs found *    Findings: Consistent with pre-operative diagnoses.         Complications: None         Jani Davis DPM     Date: 5/18/2022  Time: 08:31 CDT

## 2022-05-18 NOTE — ANESTHESIA POSTPROCEDURE EVALUATION
Patient: Mary Ann Jones    Procedure Summary     Date: 05/18/22 Room / Location:  PAD OR [address] [place] [address] PAD OR    Anesthesia Start: 0730 Anesthesia Stop: 0818    Procedure: EXCISION OF NEUROMA 3RD INTERSPACE OF FOOT - RIGHT (Right Toes) Diagnosis:       Plantar neuroma of right foot      (Plantar neuroma of right foot [G57.61])    Surgeons: Jani Davis DPM Provider: Femi Lan CRNA    Anesthesia Type: general ASA Status: 2          Anesthesia Type: general    Vitals  No vitals data found for the desired time range.          Post Anesthesia Care and Evaluation    Patient location during evaluation: PACU  Patient participation: complete - patient participated  Level of consciousness: awake and alert  Pain management: adequate  Airway patency: patent  Anesthetic complications: No anesthetic complications    Cardiovascular status: acceptable  Respiratory status: acceptable  Hydration status: acceptable    Comments: Blood pressure 138/76, pulse 70, temperature 97.5 °F (36.4 °C), temperature source Temporal, resp. rate 17, SpO2 94 %, not currently breastfeeding.    Pt discharged from PACU based on marianne score >8

## 2022-05-18 NOTE — ANESTHESIA PROCEDURE NOTES
Airway  Date/Time: 5/18/2022 7:44 AM    Additional Comments  Unable pass #[id number]  Proceed with bag mask

## 2022-05-18 NOTE — ANESTHESIA PREPROCEDURE EVALUATION
Anesthesia Evaluation     Patient summary reviewed   history of anesthetic complications: PONV  NPO Solid Status: > 8 hours             Airway   Mallampati: II  Dental    (+) partials    Pulmonary    (-) COPD, asthma, sleep apnea, not a smoker  Cardiovascular   Exercise tolerance: excellent (>7 METS)    (-) pacemaker, past MI, angina, cardiac stents      Neuro/Psych  (-) seizures, TIA, CVA  GI/Hepatic/Renal/Endo    (+)  GERD,  thyroid problem hypothyroidism  (-) liver disease, no renal disease, diabetes    Musculoskeletal     Abdominal    Substance History      OB/GYN          Other                        Anesthesia Plan    ASA 2     general     intravenous induction     Anesthetic plan, all risks, benefits, and alternatives have been provided, discussed and informed consent has been obtained with: patient.        CODE STATUS:

## 2022-05-19 ENCOUNTER — TELEPHONE (OUTPATIENT)
Dept: PODIATRY | Facility: CLINIC | Age: 77
End: 2022-05-19

## 2022-05-19 LAB
CYTO UR: NORMAL
LAB AP CASE REPORT: NORMAL
Lab: NORMAL
PATH REPORT.FINAL DX SPEC: NORMAL
PATH REPORT.GROSS SPEC: NORMAL

## 2022-05-19 NOTE — PROGRESS NOTES
[facility] - PODIATRY    Today's Date: 05/26/22    Patient Name: Mary Ann Jones  MRN: [id number]  CSN: [id number]  PCP: Marcos Hernandez APRN  Referring Provider: No ref. 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 Has fallen twice off scoot but avoided foot as best as could- pt denies pain- pt presents on knee scooter, wrapped right foot with walking shoe on right foot, stiches on top     HPI: Mary Ann Jones, a 76 y.o.female, comes to clinic as a(n) established patient for post-op appt 1 weeks s/p Plantar neuroma excision. Patient has h/o Vitas, chronic back pain, depression, GERD, hypertension, hypothyroidism. Patient presents now 1 week postop from excision of neuroma of third interspace of the right foot.  Patient states that she has had some mild pain for a couple of days postoperatively but pain has gradually faded away.  She states that she has remained nonweightbearing.  Admits that she has had 2 falls off of knee scooter while attempting to remain nonweightbearing.  Patient states that she is having hard time getting around her house due to nonweightbearing status and is unable to get up and down her stairs.  She is also requesting to begin walking and driving her car today.  Has Bandage clean, dry, and intact.  Denies pain. Relates previous treatment(s) including Neuroma injection, metatarsal cushion, surgery. Denies any constitutional symptoms. No other pedal complaints at this time.    Past Medical History:   Diagnosis Date   • Allergic rhinitis    • Arthritis    • Chronic back pain    • Chronic constipation    • Depression    • GERD (gastroesophageal reflux disease)    • Hypertension     mild    • Hypothyroidism    • Insomnia    • Macular  degeneration    • Melanoma (HCC)    • Muscle spasms of both lower extremities     and back    • Neuroma of foot     right   • PONV (postoperative nausea and vomiting)    • Thyroid cancer (HCC)      Past Surgical History:   Procedure Laterality Date   • ANTERIOR AND POSTERIOR VAGINAL REPAIR N/A 2018    Procedure: ANTERIOR AND POSTERIOR VAGINAL REPAIR;  Surgeon: Margaux Pearson MD;  Location:  [facility];  Service: Obstetrics/Gynecology   • APPENDECTOMY     • BREAST RECONSTRUCTION     • BREAST SURGERY      Ede mastectomy   • CARDIAC CATHETERIZATION     • CHOLECYSTECTOMY     • COLONOSCOPY     • ESOPHAGEAL DILATATION     • HYSTERECTOMY     • MID-URETHRAL SLING WITH CYSTOSCOPY N/A 2018    Procedure: MID-URETHRAL SLING WITH CYSTOSCOPY;  Surgeon: Margaux Pearson MD;  Location:  [facility];  Service: Obstetrics/Gynecology   • MARTÍNEZ'S NEUROMA EXCISION Right 2022    Procedure: EXCISION OF NEUROMA 3RD INTERSPACE OF FOOT - RIGHT;  Surgeon: Jani Davis DPM;  Location:  [facility];  Service: Podiatry;  Laterality: Right;   • SKIN CANCER EXCISION     • TOTAL THYROIDECTOMY       Family History   Problem Relation Age of Onset   • Cancer Mother         breast   • Alzheimer's disease Father    • Heart attack Neg Hx    • Heart disease Neg Hx    • Heart failure Neg Hx      Social History     Socioeconomic History   • Marital status:    Tobacco Use   • Smoking status: Former Smoker     Years: 30.00     Types: Cigarettes     Quit date:      Years since quittin.4   • Smokeless tobacco: Never Used   Vaping Use   • Vaping Use: Never used   Substance and Sexual Activity   • Alcohol use: Yes     Comment: socially   • Drug use: No   • Sexual activity: Defer     No Known Allergies  Current Outpatient Medications   Medication Sig Dispense Refill   • Cholecalciferol (VITAMIN D3 PO) Take 1 tablet by mouth Daily.     • Cholecalciferol (VITAMIN D3) 600 UNITS capsule capsule Take 600 Units by mouth Daily.     •  ciclopirox (PENLAC) 8 % solution Apply  topically to the appropriate area as directed Every Night for 336 days. Apply as directed to affected toenails. 6 mL 5   • Cyanocobalamin (VITAMIN B-12 PO) Take 1 tablet by mouth Daily.     • diclofenac (VOLTAREN) 75 MG EC tablet      • estradiol (ESTRACE) 2 MG tablet Take 2 mg by mouth Daily.     • hydrochlorothiazide (HYDRODIURIL) 12.5 MG tablet 12.5 mg Daily.     • levothyroxine (SYNTHROID, LEVOTHROID) 125 MCG tablet Take 1 tablet by mouth Daily. 90 tablet 3   • Multiple Vitamins-Minerals (EYE VITAMINS PO) Take 2 tablets by mouth Daily.     • pantoprazole (PROTONIX) 40 MG EC tablet      • tiZANidine (ZANAFLEX) 4 MG tablet Take 4 mg by mouth At Night As Needed for Muscle Spasms.     • HYDROcodone-acetaminophen (NORCO) 5-325 MG per tablet Take 1 tablet by mouth Every 6 (Six) Hours As Needed.     • oxyCODONE-acetaminophen (PERCOCET) 7.5-325 MG per tablet Take 1 tablet by mouth Every 6 (Six) Hours As Needed (Pain). 28 tablet 0     No current facility-administered medications for this visit.     Review of Systems   Constitutional: Negative for chills and fever.   HENT: Negative for congestion.    Respiratory: Negative for shortness of breath.    Cardiovascular: Negative for chest pain and leg swelling.   Gastrointestinal: Negative for constipation, diarrhea, nausea and vomiting.   Musculoskeletal: Negative for arthralgias and myalgias.   Skin: Positive for color change and wound.   Neurological: Negative for numbness.       OBJECTIVE     Vitals:    05/26/22 0831   BP: 138/72   Pulse: 93   SpO2: 96%       PHYSICAL EXAM  GEN:   Accompanied by none.     Foot/Ankle Exam:       General:   Appearance: appears stated age and healthy    Orientation: AAOx3    Affect: appropriate    Gait: unimpaired    Assistance: knee scooter    Shoe Gear:  Casual shoes and surgical shoe    VASCULAR      Right Foot Vascularity   Dorsalis pedis:  2+  Posterior tibial:  2+  Skin Temperature: warm    Edema  Grading:  Trace (Focal to forefoot)  CFT:  3  Pedal Hair Growth:  Present  Varicosities: moderate varicosities       Left Foot Vascularity   Dorsalis pedis:  2+  Posterior tibial:  2+  Skin Temperature: warm    Edema Grading:  None  CFT:  3  Pedal Hair Growth:  Present  Varicosities: moderate varicosities        NEUROLOGIC     Right Foot Neurologic   Normal sensation    Light touch sensation:  Normal  Vibratory sensation:  Normal  Hot/Cold sensation: normal    Protective Sensation using [place]aneth Monofilament:  10     Left Foot Neurologic   Normal sensation    Light touch sensation:  Normal  Vibratory sensation:  Normal  Hot/cold sensation: normal    Protective Sensation using [place]eth Monofilament:  10     MUSCULOSKELETAL      Right Foot Musculoskeletal   Ecchymosis:  Toe 3, toe 4, toe 2 and dorsal foot  Tenderness comment:  Mildly surrounding surgical site  Arch:  Normal  Hallux valgus: No       Left Foot Musculoskeletal   Ecchymosis:  None  Tenderness: none    Arch:  Normal  Hallux valgus: No       MUSCLE STRENGTH     Right Foot Muscle Strength   Foot dorsiflexion:  5  Foot plantar flexion:  5  Foot inversion:  5  Foot eversion:  5     Left Foot Muscle Strength   Foot dorsiflexion:  5  Foot plantar flexion:  5  Foot inversion:  5  Foot eversion:  5     RANGE OF MOTION      Right Foot Range of Motion   Foot and ankle ROM within normal limits       Left Foot Range of Motion   Foot and ankle ROM within normal limits       DERMATOLOGIC     Right Foot Dermatologic   Skin: atrophic    Skin: right foot skin not intact    Nails: onychomycosis and dystrophic nails    Nails: no ingrown toenail    Nails comment:  Hallux     Left Foot Dermatologic   Skin: skin intact and atrophic    Nails: onychomycosis and dystrophic nails    Nails: no ingrown toenail    Nails comment:  Hallux      Right Foot Additional Comments Surgical wound to third interspace on dorsal foot.  Sutures intact.  No evidence of dehiscence.   No surgical site infection.  Overall healing well.      RADIOLOGY/NUCLEAR:  XR Chest 2 View    Result Date: 5/16/2022  Narrative: EXAMINATION: XR CHEST 2 VW- 5/16/2022 12:08 PM CDT  HISTORY: pre op; G57.61-Lesion of plantar nerve, right lower limb.  REPORT: Frontal and lateral views of the chest were obtained.  COMPARISON: Chest x-ray 7/12/2016.  The lungs are mildly hyperinflated without infiltrates. Heart size is normal. No pneumothorax or pleural effusion is identified. There is S-shaped thoracolumbar scoliosis as before. No acute osseous abnormality.      Impression: No acute cardiopulmonary abnormality. This report was finalized on 05/16/2022 12:11 by Dr. Jad Pro MD.      LABORATORY/CULTURE RESULTS:      PATHOLOGY RESULTS:       ASSESSMENT/PLAN     Diagnoses and all orders for this visit:    1. Status post foot surgery (Primary)      Comprehensive lower extremity examination and evaluation was performed.  Discussed findings and treatment plan including risks, benefits, and treatment options with patient in detail. Patient agreed with treatment plan.  Surgical dressing removed and site visualized.  Overall healing as expected without complication   Similar dressing reapplied.  Dressing to remain in place for the next 2 weeks.  Plan for suture removal at follow-up appointment if no other complications.  Advised patient that she may begin bearing some weight on her heel but to avoid full weightbearing and any forefoot pressure.  Also advised patient to avoid driving as she continues to utilize surgical shoe for the right foot and that driving a car and being involved in an accident could further complicate wound healing.  An After Visit Summary was printed and given to the patient at discharge, including (if requested) any available informative/educational handouts regarding diagnosis, treatment, or medications. All questions were answered to patient/family satisfaction. Should symptoms fail to improve or  worsen they agree to call or return to clinic or to go to the Emergency Department. Discussed the importance of following up with any needed screening tests/labs/specialist appointments and any requested follow-up recommended by me today. Importance of maintaining follow-up discussed and patient accepts that missed appointments can delay diagnosis and potentially lead to worsening of conditions.  Return in about 2 weeks (around 6/9/2022) for Follow-up with Dr. Davis., or sooner if acute issues arise.    Procedures    Lab Frequency Next Occurrence   Follow Anesthesia Guidelines / Standing Orders Once 09/05/2018       This document has been electronically signed by LEYLA Culp on May 26, 2022 09:52 CDT

## 2022-05-19 NOTE — TELEPHONE ENCOUNTER
Pt has been staying off foot, keeping elevated and periodic ice, pain is lite, hasnt had to even take pain pills.

## 2022-05-26 ENCOUNTER — OFFICE VISIT (OUTPATIENT)
Dept: PODIATRY | Facility: CLINIC | Age: 77
End: 2022-05-26

## 2022-05-26 VITALS
SYSTOLIC BLOOD PRESSURE: 138 MMHG | HEART RATE: 93 BPM | OXYGEN SATURATION: 96 % | HEIGHT: 63 IN | DIASTOLIC BLOOD PRESSURE: 72 MMHG | WEIGHT: 113 LBS | BODY MASS INDEX: 20.02 KG/M2

## 2022-05-26 DIAGNOSIS — Z98.890 STATUS POST FOOT SURGERY: Primary | ICD-10-CM

## 2022-05-26 PROCEDURE — 99024 POSTOP FOLLOW-UP VISIT: CPT | Performed by: NURSE PRACTITIONER

## 2022-05-31 ENCOUNTER — TELEPHONE (OUTPATIENT)
Dept: PODIATRY | Facility: CLINIC | Age: 77
End: 2022-05-31

## 2022-05-31 NOTE — TELEPHONE ENCOUNTER
Pt called with bandage issue. Talked to dr whitman and he said just keep it covered with a bandage and she would be fine till next visit

## 2022-06-08 NOTE — PROGRESS NOTES
[facility] - PODIATRY    Today's Date: 06/09/22    Patient Name: Mary Ann Jones  MRN: [id number]  CSN: [id number]  PCP: Marcos Hernandez APRN  Referring Provider: No ref. provider found    SUBJECTIVE     Chief Complaint   Patient presents with   • post-op     2 wk po excision neuroma of the 3rd interspace, right foot. Pt states that she feels that she is doing well. Last saw PCP on 12/29/21     HPI: Mary Ann Jones, a 76 y.o.female, comes to clinic as a(n) established patient for post-op appt 3 weeks s/p Plantar neuroma excision - Right Foot. Patient has h/o Vitas, chronic back pain, depression, GERD, hypertension, hypothyroidism. Relates that she has remained NWB with a knee scooter and surgical shoe. Notes mild soresness and numbness.  Has Bandage clean, dry, and intact.  Denies pain. Relates previous treatment(s) including Neuroma injection, metatarsal cushion, surgery. Denies any constitutional symptoms. No other pedal complaints at this time.    Past Medical History:   Diagnosis Date   • Allergic rhinitis    • Arthritis    • Chronic back pain    • Chronic constipation    • Depression    • GERD (gastroesophageal reflux disease)    • Hypertension     mild    • Hypothyroidism    • Insomnia    • Macular degeneration    • Melanoma (HCC)    • Muscle spasms of both lower extremities     and back    • Neuroma of foot     right   • PONV (postoperative nausea and vomiting)    • Thyroid cancer (HCC)      Past Surgical History:   Procedure Laterality Date   • ANTERIOR AND POSTERIOR VAGINAL REPAIR N/A 9/17/2018    Procedure: ANTERIOR AND POSTERIOR VAGINAL REPAIR;  Surgeon: Margaux Pearson MD;  Location: [facility];  Service: Obstetrics/Gynecology   • APPENDECTOMY     • BREAST RECONSTRUCTION     • BREAST SURGERY      Ede mastectomy   • CARDIAC CATHETERIZATION     • CHOLECYSTECTOMY     • COLONOSCOPY     • ESOPHAGEAL DILATATION     • HYSTERECTOMY     • MID-URETHRAL SLING WITH CYSTOSCOPY N/A 9/17/2018     Procedure: MID-URETHRAL SLING WITH CYSTOSCOPY;  Surgeon: Margaux Pearson MD;  Location:  [facility];  Service: Obstetrics/Gynecology   • MARTÍNEZ'S NEUROMA EXCISION Right 2022    Procedure: EXCISION OF NEUROMA 3RD INTERSPACE OF FOOT - RIGHT;  Surgeon: Jani Davis DPM;  Location:  [facility];  Service: Podiatry;  Laterality: Right;   • SKIN CANCER EXCISION     • TOTAL THYROIDECTOMY       Family History   Problem Relation Age of Onset   • Cancer Mother         breast   • Alzheimer's disease Father    • Heart attack Neg Hx    • Heart disease Neg Hx    • Heart failure Neg Hx      Social History     Socioeconomic History   • Marital status:    Tobacco Use   • Smoking status: Former Smoker     Years: 30.00     Types: Cigarettes     Quit date:      Years since quittin.4   • Smokeless tobacco: Never Used   Vaping Use   • Vaping Use: Never used   Substance and Sexual Activity   • Alcohol use: Yes     Comment: socially   • Drug use: No   • Sexual activity: Defer     No Known Allergies  Current Outpatient Medications   Medication Sig Dispense Refill   • Cholecalciferol (VITAMIN D3 PO) Take 1 tablet by mouth Daily.     • Cholecalciferol (VITAMIN D3) 600 UNITS capsule capsule Take 600 Units by mouth Daily.     • ciclopirox (PENLAC) 8 % solution Apply  topically to the appropriate area as directed Every Night for 336 days. Apply as directed to affected toenails. 6 mL 5   • Cyanocobalamin (VITAMIN B-12 PO) Take 1 tablet by mouth Daily.     • diclofenac (VOLTAREN) 75 MG EC tablet      • estradiol (ESTRACE) 2 MG tablet Take 2 mg by mouth Daily.     • hydrochlorothiazide (HYDRODIURIL) 12.5 MG tablet 12.5 mg Daily.     • HYDROcodone-acetaminophen (NORCO) 5-325 MG per tablet Take 1 tablet by mouth Every 6 (Six) Hours As Needed.     • levothyroxine (SYNTHROID, LEVOTHROID) 125 MCG tablet Take 1 tablet by mouth Daily. 90 tablet 3   • Multiple Vitamins-Minerals (EYE VITAMINS PO) Take 2 tablets by mouth Daily.     •  oxyCODONE-acetaminophen (PERCOCET) 7.5-325 MG per tablet Take 1 tablet by mouth Every 6 (Six) Hours As Needed (Pain). 28 tablet 0   • pantoprazole (PROTONIX) 40 MG EC tablet      • tiZANidine (ZANAFLEX) 4 MG tablet Take 4 mg by mouth At Night As Needed for Muscle Spasms.       No current facility-administered medications for this visit.     Review of Systems   Constitutional: Negative for chills and fever.   HENT: Negative for congestion.    Respiratory: Negative for shortness of breath.    Cardiovascular: Negative for chest pain and leg swelling.   Gastrointestinal: Negative for constipation, diarrhea, nausea and vomiting.   Musculoskeletal: Negative for arthralgias and myalgias.   Skin: Positive for color change and wound.   Neurological: Negative for numbness.       OBJECTIVE     Vitals:    06/09/22 0825   BP: 148/84   Pulse: 82   SpO2: 95%       PHYSICAL EXAM  GEN:   Accompanied by none.     Foot/Ankle Exam:       General:   Appearance: appears stated age and healthy    Orientation: AAOx3    Affect: appropriate    Gait: unimpaired    Assistance: knee scooter    Shoe Gear:  Surgical shoe    VASCULAR      Right Foot Vascularity   Dorsalis pedis:  2+  Posterior tibial:  2+  Skin Temperature: warm    Edema Grading:  Trace (Focal to forefoot)  CFT:  3  Pedal Hair Growth:  Present  Varicosities: moderate varicosities       Left Foot Vascularity   Dorsalis pedis:  2+  Posterior tibial:  2+  Skin Temperature: warm    Edema Grading:  None  CFT:  3  Pedal Hair Growth:  Present  Varicosities: moderate varicosities        NEUROLOGIC     Right Foot Neurologic   Normal sensation    Light touch sensation:  Normal  Vibratory sensation:  Normal  Hot/Cold sensation: normal    Protective Sensation using [place]-Yaneth Monofilament:  10     Left Foot Neurologic   Normal sensation    Light touch sensation:  Normal  Vibratory sensation:  Normal  Hot/cold sensation: normal    Protective Sensation using [place]aneth Monofilament:   10     MUSCULOSKELETAL      Right Foot Musculoskeletal   Ecchymosis:  Toe 3, toe 4, toe 2 and dorsal foot (improved)  Tenderness comment:  Minimal to surgical site  Arch:  Normal  Hallux valgus: No       Left Foot Musculoskeletal   Ecchymosis:  None  Tenderness: none    Arch:  Normal  Hallux valgus: No       MUSCLE STRENGTH     Right Foot Muscle Strength   Foot dorsiflexion:  5  Foot plantar flexion:  5  Foot inversion:  5  Foot eversion:  5     Left Foot Muscle Strength   Foot dorsiflexion:  5  Foot plantar flexion:  5  Foot inversion:  5  Foot eversion:  5     RANGE OF MOTION      Right Foot Range of Motion   Foot and ankle ROM within normal limits       Left Foot Range of Motion   Foot and ankle ROM within normal limits       DERMATOLOGIC     Right Foot Dermatologic   Skin: skin intact and atrophic    Nails: onychomycosis and dystrophic nails    Nails: no ingrown toenail    Nails comment:  Hallux     Left Foot Dermatologic   Skin: skin intact and atrophic    Nails: onychomycosis and dystrophic nails    Nails: no ingrown toenail    Nails comment:  Hallux      Right Foot Additional Comments Surgical wound to third interspace on dorsal foot.  Sutures intact.  Upon suture removal, fully coapted.       RADIOLOGY/NUCLEAR:  XR Chest 2 View    Result Date: 5/16/2022  Narrative: EXAMINATION: XR CHEST 2 VW- 5/16/2022 12:08 PM CDT  HISTORY: pre op; G57.61-Lesion of plantar nerve, right lower limb.  REPORT: Frontal and lateral views of the chest were obtained.  COMPARISON: Chest x-ray 7/12/2016.  The lungs are mildly hyperinflated without infiltrates. Heart size is normal. No pneumothorax or pleural effusion is identified. There is S-shaped thoracolumbar scoliosis as before. No acute osseous abnormality.      Impression: No acute cardiopulmonary abnormality. This report was finalized on 05/16/2022 12:11 by Dr. Jad Pro MD.      LABORATORY/CULTURE RESULTS:      PATHOLOGY RESULTS:       ASSESSMENT/PLAN     Diagnoses  and all orders for this visit:    1. Status post foot surgery (Primary)      Comprehensive lower extremity examination and evaluation was performed.  Discussed findings and treatment plan including risks, benefits, and treatment options with patient in detail. Patient agreed with treatment plan.  Surgical dressing removed and site visualized.  Sutures removed.    Applied ACE  Continue surgical shoe for 1 week but then may slowly return to regular shoes and activities to tolerance.  An After Visit Summary was printed and given to the patient at discharge, including (if requested) any available informative/educational handouts regarding diagnosis, treatment, or medications. All questions were answered to patient/family satisfaction. Should symptoms fail to improve or worsen they agree to call or return to clinic or to go to the Emergency Department. Discussed the importance of following up with any needed screening tests/labs/specialist appointments and any requested follow-up recommended by me today. Importance of maintaining follow-up discussed and patient accepts that missed appointments can delay diagnosis and potentially lead to worsening of conditions.  Return if symptoms worsen or fail to improve., or sooner if acute issues arise.    Procedures    Lab Frequency Next Occurrence   Follow Anesthesia Guidelines / Standing Orders Once 09/05/2018       This document has been electronically signed by Jani Davis DPM on June 9, 2022 08:57 CDT

## 2022-06-09 ENCOUNTER — OFFICE VISIT (OUTPATIENT)
Dept: PODIATRY | Facility: CLINIC | Age: 77
End: 2022-06-09

## 2022-06-09 VITALS
WEIGHT: 114 LBS | BODY MASS INDEX: 20.2 KG/M2 | HEIGHT: 63 IN | OXYGEN SATURATION: 95 % | DIASTOLIC BLOOD PRESSURE: 84 MMHG | SYSTOLIC BLOOD PRESSURE: 148 MMHG | HEART RATE: 82 BPM

## 2022-06-09 DIAGNOSIS — Z98.890 STATUS POST FOOT SURGERY: Primary | ICD-10-CM

## 2022-06-09 PROCEDURE — 99024 POSTOP FOLLOW-UP VISIT: CPT | Performed by: PODIATRIST

## 2022-06-16 ENCOUNTER — TELEPHONE (OUTPATIENT)
Dept: PODIATRY | Facility: CLINIC | Age: 77
End: 2022-06-16

## 2022-06-16 NOTE — TELEPHONE ENCOUNTER
Caller: PATIENT     Relationship to patient: SELF    Best call back number: [phone]    Patient is needing: PATIENT STATES SHE HAD PAIN LIKE BEFORE SURGERY LAST NIGHT IN 3RD AND 4TH TOE FOR ABOUT AN HOUR SHE TOOK A STRONG PAIN PILL AND IS FINE THIS MORNING. SHE ALSO CHANGED HER BANDAGE AS SHE FELT IT WAS TOO TIGHT. SHE WANTED TO MAKE ADDI AWARE OF THIS.

## 2022-06-16 NOTE — TELEPHONE ENCOUNTER
Called pt back and let her know this is normal and she can progress down to a bandaid for bandage while it heals

## 2022-06-27 DIAGNOSIS — B35.1 ONYCHOMYCOSIS: ICD-10-CM

## 2023-06-22 ENCOUNTER — TELEPHONE (OUTPATIENT)
Dept: PODIATRY | Facility: CLINIC | Age: 78
End: 2023-06-22

## 2023-06-22 NOTE — TELEPHONE ENCOUNTER
Provider: DR AYALA    Caller: GRECIA HESS    Relationship to Patient: SELF    Phone Number: [phone]    Reason for Call: PT HAS TROUBLE BEARING WEIGHT ON RIGHT FOOT, APPT IS NOT UNTIL AUGUST, WOULD LIKE TO KNOW HOW SHE CAN GET A PAD FOR HER SHOE LIKE SHE USED BEFORE HER SURGERY TO HELP WITH MOVEMENT.

## 2024-02-22 NOTE — TELEPHONE ENCOUNTER
Addended by: ISA PARR on: 2/22/2024 10:31 AM     Modules accepted: Orders     Her breathing is very heavy and worsen for about a week now. She still has pain under her left breast and soreness too.   I see from your last office note that you may order stress test if her symptoms worsen.  Is this something you want to go ahead and get done?

## (undated) DEVICE — DRSNG WND GZ CURAD OIL EMULSION 3X3IN STRL

## (undated) DEVICE — ELECTRD BLD EDGE/INSUL1P 2.4X5.1MM STRL

## (undated) DEVICE — PK EXTRM 30

## (undated) DEVICE — CATHETER,FOLEY,SILI-ELAST,LTX,20FR,10ML: Brand: MEDLINE

## (undated) DEVICE — NDL HYPO PRECISIONGLIDE REG 22G 1 1/2

## (undated) DEVICE — BNDG ELAS CO-FLEX SLF ADHR 4IN5YD LF STRL

## (undated) DEVICE — 4-PORT MANIFOLD: Brand: NEPTUNE 2

## (undated) DEVICE — SYR CONTRL LUERLOK 10CC

## (undated) DEVICE — BANDAGE,GAUZE,BULKEE II,4.5"X4.1YD,STRL: Brand: MEDLINE

## (undated) DEVICE — TBG PENCL TELESCP MEGADYNE SMOKE EVAC 10FT

## (undated) DEVICE — SYR LUERLOK 20CC BX/50

## (undated) DEVICE — TRAP FLD MINIVAC MEGADYNE 100ML

## (undated) DEVICE — SYR LUERLOK 30CC

## (undated) DEVICE — DRAINBAG,ANTI-REFLUX TOWER,L/F,2000ML,LL: Brand: MEDLINE

## (undated) DEVICE — SPONGE,LAP,4"X18",XR,ST,5/PK,40PK/CS: Brand: MEDLINE INDUSTRIES, INC.

## (undated) DEVICE — GLV SURG SENSICARE W/ALOE PF LF SZ6 STRL

## (undated) DEVICE — ANTIBACTERIAL UNDYED BRAIDED (POLYGLACTIN 910), SYNTHETIC ABSORBABLE SUTURE: Brand: COATED VICRYL

## (undated) DEVICE — CLTH CLENS READYCLEANSE PERI CARE PK/5

## (undated) DEVICE — DISPOSABLE TOURNIQUET CUFF SINGLE BLADDER, SINGLE PORT AND QUICK CONNECT CONNECTOR: Brand: COLOR CUFF

## (undated) DEVICE — PK TURNOVER RM ADV

## (undated) DEVICE — CYSTO/BLADDER IRRIGATION SET, REGULATING CLAMP

## (undated) DEVICE — SPNG GZ PKNG XRAY/DETECT 4PLY 2X36IN STRL

## (undated) DEVICE — NDL HYPO PRECISIONGLIDE REG 25G 1 1/2

## (undated) DEVICE — ANTIBACTERIAL VIOLET BRAIDED (POLYGLACTIN 910), SYNTHETIC ABSORBABLE SUTURE: Brand: COATED VICRYL

## (undated) DEVICE — CATHETER,FOLEY,SILI-ELAST,LTX,16FR,10ML: Brand: MEDLINE

## (undated) DEVICE — GLV SURG SENSICARE PI ORTHO SZ7.5 LF STRL

## (undated) DEVICE — TOTAL TRAY, 16FR 10ML SIL FOLEY, URN: Brand: MEDLINE

## (undated) DEVICE — PAD MAJOR LITHOTOMY: Brand: MEDLINE INDUSTRIES, INC.